# Patient Record
Sex: FEMALE | Race: BLACK OR AFRICAN AMERICAN | Employment: FULL TIME | URBAN - METROPOLITAN AREA
[De-identification: names, ages, dates, MRNs, and addresses within clinical notes are randomized per-mention and may not be internally consistent; named-entity substitution may affect disease eponyms.]

---

## 2017-11-21 ENCOUNTER — TELEPHONE (OUTPATIENT)
Dept: INTERNAL MEDICINE CLINIC | Facility: CLINIC | Age: 52
End: 2017-11-21

## 2017-11-22 NOTE — TELEPHONE ENCOUNTER
[facility] lab ordered last year 16   Just   Dx Z00.00  She is responsible to check on coverage of code z00.00 since she is requesting labs ahead of time I am not sure what code to use

## 2017-11-27 NOTE — TELEPHONE ENCOUNTER
Spoke to Pt states will just wait until the day of the appt for the lab orders . Pt verbalized understanding denied questions .

## 2017-12-05 ENCOUNTER — OFFICE VISIT (OUTPATIENT)
Dept: INTERNAL MEDICINE CLINIC | Facility: CLINIC | Age: 52
End: 2017-12-05

## 2017-12-05 VITALS
WEIGHT: 192 LBS | BODY MASS INDEX: 31.99 KG/M2 | DIASTOLIC BLOOD PRESSURE: 67 MMHG | TEMPERATURE: 97 F | HEIGHT: 65 IN | HEART RATE: 102 BPM | SYSTOLIC BLOOD PRESSURE: 92 MMHG

## 2017-12-05 DIAGNOSIS — Z12.11 SCREENING FOR COLON CANCER: ICD-10-CM

## 2017-12-05 DIAGNOSIS — Z00.00 ROUTINE GENERAL MEDICAL EXAMINATION AT A HEALTH CARE FACILITY: Primary | ICD-10-CM

## 2017-12-05 DIAGNOSIS — I83.893 VARICOSE VEINS OF BOTH LEGS WITH EDEMA: ICD-10-CM

## 2017-12-05 DIAGNOSIS — Z12.31 VISIT FOR SCREENING MAMMOGRAM: ICD-10-CM

## 2017-12-05 PROCEDURE — 99396 PREV VISIT EST AGE 40-64: CPT | Performed by: INTERNAL MEDICINE

## 2017-12-05 NOTE — PROGRESS NOTES
HPI:    Patient ID: Alix Riley is a 47year old female.     HPI    Physical exam    Generally healthy  Feeling well    BP 92/67 (BP Location: Right arm, Patient Position: Sitting, Cuff Size: adult)   Pulse 102   Temp (!) 97.1 °F (36.2 °C) (Oral)   Ht 5' Disorder Father      DVT   • Other[other] [OTHER] Maternal Grandmother       Social History: Smoking status: Never Smoker                                                              Smokeless tobacco: Never Used                      Alcohol use:  No SCREENING BILAT (CPT=77067)        Self breast exam aadvsied    (Z12.11) Screening for colon cancer  Plan: GASTRO - INTERNAL        asymptamtic    (C41.482) Varicose veins of both legs with edema  Plan: VASCULAR SURGERY - INTERNAL        [facility]

## 2019-03-13 ENCOUNTER — OFFICE VISIT (OUTPATIENT)
Dept: INTERNAL MEDICINE CLINIC | Facility: CLINIC | Age: 54
End: 2019-03-13
Payer: COMMERCIAL

## 2019-03-13 ENCOUNTER — APPOINTMENT (OUTPATIENT)
Dept: LAB | Age: 54
End: 2019-03-13
Attending: INTERNAL MEDICINE
Payer: COMMERCIAL

## 2019-03-13 VITALS
HEART RATE: 83 BPM | SYSTOLIC BLOOD PRESSURE: 108 MMHG | DIASTOLIC BLOOD PRESSURE: 74 MMHG | HEIGHT: 65 IN | TEMPERATURE: 99 F | WEIGHT: 183 LBS | BODY MASS INDEX: 30.49 KG/M2

## 2019-03-13 DIAGNOSIS — Z12.31 VISIT FOR SCREENING MAMMOGRAM: ICD-10-CM

## 2019-03-13 DIAGNOSIS — Z00.00 ROUTINE GENERAL MEDICAL EXAMINATION AT A HEALTH CARE FACILITY: Primary | ICD-10-CM

## 2019-03-13 DIAGNOSIS — Z12.11 COLON CANCER SCREENING: ICD-10-CM

## 2019-03-13 DIAGNOSIS — Z00.00 ROUTINE GENERAL MEDICAL EXAMINATION AT A HEALTH CARE FACILITY: ICD-10-CM

## 2019-03-13 LAB
ALBUMIN SERPL-MCNC: 3.8 G/DL (ref 3.4–5)
ALBUMIN/GLOB SERPL: 1 {RATIO} (ref 1–2)
ALP LIVER SERPL-CCNC: 70 U/L (ref 41–108)
ALT SERPL-CCNC: 29 U/L (ref 13–56)
ANION GAP SERPL CALC-SCNC: 8 MMOL/L (ref 0–18)
AST SERPL-CCNC: 17 U/L (ref 15–37)
BACTERIA UR QL AUTO: NEGATIVE /HPF
BILIRUB SERPL-MCNC: 0.5 MG/DL (ref 0.1–2)
BUN BLD-MCNC: 11 MG/DL (ref 7–18)
BUN/CREAT SERPL: 14.3 (ref 10–20)
CALCIUM BLD-MCNC: 9.1 MG/DL (ref 8.5–10.1)
CHLORIDE SERPL-SCNC: 106 MMOL/L (ref 98–107)
CHOLEST SMN-MCNC: 150 MG/DL (ref ?–200)
CO2 SERPL-SCNC: 27 MMOL/L (ref 21–32)
CREAT BLD-MCNC: 0.77 MG/DL (ref 0.55–1.02)
DEPRECATED RDW RBC AUTO: 40.4 FL (ref 35.1–46.3)
ERYTHROCYTE [DISTWIDTH] IN BLOOD BY AUTOMATED COUNT: 13.6 % (ref 11–15)
GLOBULIN PLAS-MCNC: 3.9 G/DL (ref 2.8–4.4)
GLUCOSE BLD-MCNC: 91 MG/DL (ref 70–99)
HCT VFR BLD AUTO: 38.5 % (ref 35–48)
HDLC SERPL-MCNC: 53 MG/DL (ref 40–59)
HGB BLD-MCNC: 12.4 G/DL (ref 12–16)
LDLC SERPL CALC-MCNC: 82 MG/DL (ref ?–100)
M PROTEIN MFR SERPL ELPH: 7.7 G/DL (ref 6.4–8.2)
MCH RBC QN AUTO: 26.6 PG (ref 26–34)
MCHC RBC AUTO-ENTMCNC: 32.2 G/DL (ref 31–37)
MCV RBC AUTO: 82.4 FL (ref 80–100)
NONHDLC SERPL-MCNC: 97 MG/DL (ref ?–130)
OSMOLALITY SERPL CALC.SUM OF ELEC: 291 MOSM/KG (ref 275–295)
PLATELET # BLD AUTO: 258 10(3)UL (ref 150–450)
POTASSIUM SERPL-SCNC: 3.8 MMOL/L (ref 3.5–5.1)
RBC # BLD AUTO: 4.67 X10(6)UL (ref 3.8–5.3)
RBC #/AREA URNS AUTO: 1 /HPF
SODIUM SERPL-SCNC: 141 MMOL/L (ref 136–145)
T4 FREE SERPL-MCNC: 0.9 NG/DL (ref 0.8–1.7)
TRIGL SERPL-MCNC: 75 MG/DL (ref 30–149)
TSI SER-ACNC: 1.66 MIU/ML (ref 0.36–3.74)
VLDLC SERPL CALC-MCNC: 15 MG/DL (ref 0–30)
WBC # BLD AUTO: 6.6 X10(3) UL (ref 4–11)
WBC #/AREA URNS AUTO: 3 /HPF

## 2019-03-13 PROCEDURE — 81015 MICROSCOPIC EXAM OF URINE: CPT

## 2019-03-13 PROCEDURE — 83036 HEMOGLOBIN GLYCOSYLATED A1C: CPT

## 2019-03-13 PROCEDURE — 36415 COLL VENOUS BLD VENIPUNCTURE: CPT

## 2019-03-13 PROCEDURE — 84439 ASSAY OF FREE THYROXINE: CPT

## 2019-03-13 PROCEDURE — 80061 LIPID PANEL: CPT

## 2019-03-13 PROCEDURE — 99396 PREV VISIT EST AGE 40-64: CPT | Performed by: INTERNAL MEDICINE

## 2019-03-13 PROCEDURE — 80053 COMPREHEN METABOLIC PANEL: CPT

## 2019-03-13 PROCEDURE — 85027 COMPLETE CBC AUTOMATED: CPT

## 2019-03-13 PROCEDURE — 84443 ASSAY THYROID STIM HORMONE: CPT

## 2019-03-14 ENCOUNTER — OFFICE VISIT (OUTPATIENT)
Dept: OBGYN CLINIC | Facility: CLINIC | Age: 54
End: 2019-03-14
Payer: COMMERCIAL

## 2019-03-14 VITALS — SYSTOLIC BLOOD PRESSURE: 124 MMHG | DIASTOLIC BLOOD PRESSURE: 72 MMHG | WEIGHT: 181 LBS | BODY MASS INDEX: 30 KG/M2

## 2019-03-14 DIAGNOSIS — Z30.432 ENCOUNTER FOR REMOVAL OF INTRAUTERINE CONTRACEPTIVE DEVICE: Primary | ICD-10-CM

## 2019-03-14 LAB
EST. AVERAGE GLUCOSE BLD GHB EST-MCNC: 189 MG/DL (ref 68–126)
HBA1C MFR BLD HPLC: 8.2 % (ref ?–5.7)

## 2019-03-14 PROCEDURE — 58301 REMOVE INTRAUTERINE DEVICE: CPT | Performed by: OBSTETRICS & GYNECOLOGY

## 2019-03-24 NOTE — PROGRESS NOTES
HPI:    Patient ID: Pam Alvarado is a 53year old female.     HPI    Physical exam    Generally healthy    /74 (BP Location: Left arm, Patient Position: Sitting, Cuff Size: adult)   Pulse 83   Temp 98.7 °F (37.1 °C) (Oral)   Ht 5' 5\" (1.651 m)   Wt embolism ([facility] [address] 2012      No past surgical history on file.    Family History   Problem Relation Age of Onset   • Clotting Disorder Father         DVT   • Other (Other[other]) Maternal Grandmother       Social History: Social History    Tobacco Use      Smok A1C, URINE MICROSCOPIC W REFLEX         CULTURE        Generally healthy    (Z12.31) Visit for screening mammogram  Plan: [facility] SCREENING BILAT (FTP=98433)        Self breast exam advsed   Routine pap advised  Body mass index is 30.45 kg/m².   Healthy diet  E

## 2019-05-21 ENCOUNTER — APPOINTMENT (OUTPATIENT)
Dept: LAB | Age: 54
End: 2019-05-21
Attending: INTERNAL MEDICINE
Payer: COMMERCIAL

## 2019-05-21 ENCOUNTER — HOSPITAL ENCOUNTER (OUTPATIENT)
Dept: MAMMOGRAPHY | Age: 54
Discharge: HOME | End: 2019-05-21
Attending: INTERNAL MEDICINE
Payer: COMMERCIAL

## 2019-05-21 DIAGNOSIS — R73.01 ABNORMAL FASTING GLUCOSE: ICD-10-CM

## 2019-05-21 DIAGNOSIS — Z12.31 VISIT FOR SCREENING MAMMOGRAM: ICD-10-CM

## 2019-05-21 PROCEDURE — 77063 BREAST TOMOSYNTHESIS BI: CPT | Performed by: INTERNAL MEDICINE

## 2019-05-21 PROCEDURE — 83036 HEMOGLOBIN GLYCOSYLATED A1C: CPT

## 2019-05-21 PROCEDURE — 77067 SCR MAMMO BI INCL CAD: CPT | Performed by: INTERNAL MEDICINE

## 2019-05-21 PROCEDURE — 36415 COLL VENOUS BLD VENIPUNCTURE: CPT

## 2019-05-21 PROCEDURE — 82947 ASSAY GLUCOSE BLOOD QUANT: CPT

## 2019-05-22 ENCOUNTER — OFFICE VISIT (OUTPATIENT)
Dept: INTERNAL MEDICINE CLINIC | Facility: CLINIC | Age: 54
End: 2019-05-22
Payer: COMMERCIAL

## 2019-05-22 VITALS
WEIGHT: 187 LBS | SYSTOLIC BLOOD PRESSURE: 100 MMHG | HEIGHT: 66 IN | TEMPERATURE: 98 F | HEART RATE: 92 BPM | OXYGEN SATURATION: 97 % | DIASTOLIC BLOOD PRESSURE: 64 MMHG | BODY MASS INDEX: 30.05 KG/M2

## 2019-05-22 DIAGNOSIS — M10.072 ACUTE IDIOPATHIC GOUT OF LEFT FOOT: Primary | ICD-10-CM

## 2019-05-22 PROCEDURE — 99213 OFFICE O/P EST LOW 20 MIN: CPT | Performed by: INTERNAL MEDICINE

## 2019-05-22 PROCEDURE — 99212 OFFICE O/P EST SF 10 MIN: CPT | Performed by: INTERNAL MEDICINE

## 2019-05-22 NOTE — PROGRESS NOTES
HPI:    Patient ID: Herbert Lipscomb is a 53year old female.   Chief Complaint: Foot Pain (pt started having pain and swelling this moring of left foot and is moving up into her leg)    58-year-old female who presents with acute onset left foot swelling and d Constitutional: She is oriented to person, place, and time. She appears well-developed. No distress. HENT:   Head: Normocephalic and atraumatic. Eyes: Conjunctivae are normal. Right eye exhibits no discharge. Left eye exhibits no discharge.    Neck: Nec Preventive measures discussed and further education provided to patient in after visit summary. Potential medication side effects discussed. All questions answered. Patient understands and agrees to follow directions and advice.  Patient asked to sign up f Cartilage is a smooth substance that protects the ends of your bones and provides cushioning. When you have arthritis, this cartilage breaks down and can no longer protect your bones. This can happen from an autoimmune disease.  Or it can happen from wear a Gout is an inflammation of a joint due to a build-up of gout crystals in the joint fluid. This occurs when there is an excess of uric acid (a normal waste product) in the body.  Uric acid builds up in the body when the kidneys are unable to filter enough of · If pain medicines have been prescribed, take them exactly as directed.    Preventing attacks  · Minimize or avoid alcohol use. Excess alcohol intake can cause a gout attack. · Limit these foods and beverages:  ? Organ meats, such as kidneys and liver  ? Gout is a disease that affects the joints. It is caused by excess uric acid in your blood that may lead to crystals forming in your joints. Left untreated, it can lead to painful foot and joint deformities and even kidney problems.  But, by treating gout ea · Take any medicines prescribed by your healthcare provider. · Lose weight if you need to. · Reduce high fructose corn syrup in meals and drinks. · Reduce or cut out alcohol, particularly beer, but also red wine and spirits.   · Control blood pressure, d · Vegetables such as asparagus, cauliflower, spinach, and mushrooms used to be thought to contribute to an increased risk for a gout attack, but recent studies show that high purine vegetables don't increase the risk for a gout attack.   Eat more of these f Gout is a painful form of arthritis caused by an excess of uric acid. This is a waste product made by the body. It builds up in the body and forms crystals that collect in the joints, causing a gout attack.  Alcohol and certain foods can trigger a gout ezio The following guidelines are recommended by the [address] [place] [address] for people with gout. Your diet should be:  · High in fiber, whole grains, fruits, and vegetables. · Low in protein (15% of calories should come from protein.  Choose lean sources · Alcohol (particularly beer, but also red wine and spirits)  · Certain meats (red meat, processed meat, turkey)  · Organ meats (kidney, liver, sweetbread)  · Shellfish (lobster, crab, shrimp, scallop, mussel)  · Certain fish (anchovy, sardine, herring, ma

## 2019-05-22 NOTE — PATIENT INSTRUCTIONS
Aleve 500mg TWICE daily with food x 5-7 days. If symptoms don't improve, contact the office to discuss possible steroids.  If pain becomes severe or you are unable to put weight on your leg, please go to ER for further evaluation and to rule out septic arth Following a healthy lifestyle by losing weight and exercising can help ease symptoms of osteoarthritis. Strengthening muscles around the affected joint may will reduce the strain on the joint. Hot and cold packs may help.  Over-the-counter and prescription · When sitting or lying down, raise the painful joint to a level higher than your heart. · Apply an ice pack (ice cubes in a plastic bag wrapped in a thin towel) over the injured area for 20 minutes every 1 to 2 hours the first day for pain relief.  Contin Date Last Reviewed: 3/1/2017  © 3943-8065 [employer] [address]. [address] [facility], [address] [place]. All rights reserved. This information is not intended as a substitute for professional medical care.  Always follow your healthcare professional' How can I prevent gout? With a little effort, you may be able to prevent gout attacks in the future. Here are some things you can do:  · Don't eat foods high in purines  ? Certain meats (red meat, processed meat, turkey)  ?  Organ meats (kidney, liver, swe · Certain fish, including anchovies, sardines, fish eggs, and herring  · Shellfish  · Certain meats, such as red meat, hot dogs, luncheon meats, and turkey  · Organ meats, such as liver, kidneys, and sweetbreads  · Legumes, such as dried beans and peas  · Gout is a painful form of arthritis caused by an excess of uric acid. This is a waste product made by the body. It builds up in the body and forms crystals that collect in the joints, causing a gout attack.  Alcohol and certain foods can trigger a gout ezio The following guidelines are recommended by the [address] [place] [address] for people with gout. Your diet should be:  · High in fiber, whole grains, fruits, and vegetables. · Low in protein (15% of calories should come from protein.  Choose lean sources · Alcohol (particularly beer, but also red wine and spirits)  · Certain meats (red meat, processed meat, turkey)  · Organ meats (kidney, liver, sweetbread)  · Shellfish (lobster, crab, shrimp, scallop, mussel)  · Certain fish (anchovy, sardine, herring, ma

## 2019-05-29 ENCOUNTER — OFFICE VISIT (OUTPATIENT)
Dept: INTERNAL MEDICINE CLINIC | Facility: CLINIC | Age: 54
End: 2019-05-29
Payer: COMMERCIAL

## 2019-05-29 VITALS
BODY MASS INDEX: 29.57 KG/M2 | SYSTOLIC BLOOD PRESSURE: 110 MMHG | HEART RATE: 91 BPM | TEMPERATURE: 99 F | DIASTOLIC BLOOD PRESSURE: 70 MMHG | WEIGHT: 184 LBS | HEIGHT: 66 IN

## 2019-05-29 DIAGNOSIS — E66.3 OVERWEIGHT (BMI 25.0-29.9): ICD-10-CM

## 2019-05-29 DIAGNOSIS — M79.671 PAIN IN BOTH FEET: ICD-10-CM

## 2019-05-29 DIAGNOSIS — E11.9 TYPE 2 DIABETES MELLITUS WITHOUT COMPLICATION, WITHOUT LONG-TERM CURRENT USE OF INSULIN (HCC): Primary | ICD-10-CM

## 2019-05-29 DIAGNOSIS — M79.672 PAIN IN BOTH FEET: ICD-10-CM

## 2019-05-29 PROCEDURE — 99214 OFFICE O/P EST MOD 30 MIN: CPT | Performed by: INTERNAL MEDICINE

## 2019-05-29 PROCEDURE — 99212 OFFICE O/P EST SF 10 MIN: CPT | Performed by: INTERNAL MEDICINE

## 2019-05-29 RX ORDER — METFORMIN HYDROCHLORIDE 500 MG/1
500 TABLET, EXTENDED RELEASE ORAL 2 TIMES DAILY WITH MEALS
Qty: 60 TABLET | Refills: 11 | Status: SHIPPED | OUTPATIENT
Start: 2019-05-29 | End: 2019-05-29

## 2019-05-29 RX ORDER — METFORMIN HYDROCHLORIDE 500 MG/1
500 TABLET, EXTENDED RELEASE ORAL
Qty: 30 TABLET | Refills: 2 | Status: SHIPPED | OUTPATIENT
Start: 2019-05-29 | End: 2019-10-17

## 2019-05-29 NOTE — PATIENT INSTRUCTIONS
Component      Latest Ref Rng & Units 5/21/2019 3/13/2019   Glucose      70 - 99 mg/dL 153 (H) 91   Sodium      136 - 145 mmol/L  141   Potassium      3.5 - 5.1 mmol/L  3.8   Chloride      98 - 107 mmol/L  106   Carbon Dioxide, Total      21.0 - 32.0 mmol/ Eating well-balanced meals in the correct amounts will help you control your blood glucose levels and prevent low blood sugar reactions. It will also help you reduce the health risks of diabetes.  There is no one specific diet that is right for everyone wit diabetes already have a risk of high blood pressure and heart disease. · Stay at a healthy weight. If you need to lose weight, cut down on your portion sizes. But don’t skip meals.  Exercise is an important part of any weight management program. Talk with raise blood sugar. In fact, fiber can help keep blood sugar from rising too fast. It also helps keep blood cholesterol at a healthy level. Did you know?   Even though carbohydrates raise blood sugar, it’s best to have some in every meal. They are an import nuts, and soy products, such as tofu and soymilk. These sources tend to be cholesterol-free and low in saturated fat. · Animal protein is found in fish, poultry, meat, cheese, milk, and eggs. These contain cholesterol and can be high in saturated fat.  Aim herbs  · Broiled, roasted, or grilled chicken sandwich  · Sliced turkey or lean roast beef sandwich  Maldives  · Chicken enchilada, without cheese or sour cream   · Small burrito with whole beans and chicken  · Whole beans (not refried) and rice  · Chicken you all about managing diabetes. · A health psychologist or , who can help you cope with the feelings and stresses that diabetes may bring.   · Other healthcare team members can include an eye healthcare provider, dentist, podiatrist, Nena Garcia yams, and squash. Kidney beans, solorzano beans, black beans, garbanzo beans, and lentils also contain starches. Sugars  Sugars are found naturally in many foods. Or sugar can be added.  Foods that contain natural sugar include fruits and fruit juices, dairy p of bread  · 1/2 cup of oatmeal  · 1/3 cup of rice  · 4 to 6 crackers  · 1/2 English muffin  · 1/2 cup of black beans  · 1/4 of a large baked potato (3 ounces)  · 2/3 cup of plain fat-free yogurt  · 1 cup of soup  · 1/2 cup of casserole  · 6 chicken nuggets 7/1/2016  © 5033-4260 [employer] [address]. [address] [facility], [address] [place] [address]. All rights reserved. This information is not intended as a substitute for professional medical care. Always follow your healthcare professional's instructions. your thumb. Keeping track of serving sizes  When you’re planning for a snack or a meal, keep servings in mind.  If you don’t have measuring cups or a scale handy, there are ways to [place] serving sizes, such as comparing your food to the size of your ha

## 2019-05-29 NOTE — PROGRESS NOTES
HPI:    Patient ID: Nicole Rodriguez is a 53year old female.     HPI     New onset diabetes  Discuss labs    /70 (BP Location: Right arm, Patient Position: Sitting, Cuff Size: large)   Pulse 91   Temp 98.6 °F (37 °C) (Oral)   Ht 5' 6\" (1.676 m)   Wt 18 • Personal history of pulmonary embolism 6/4/2013   • Pulmonary embolism ([facility] [address] 2012      No past surgical history on file.    Family History   Problem Relation Age of Onset   • Clotting Disorder Father         DVT   • Other (Other) Maternal Grandmother NON HDL CHOL      <130 mg/dL  97   SQUAM EPI CELLS UR      /HPF  Few   WBC Urine      0 - 5 /HPF  3   RBC URINE      0 - 3 /HPF  1   Bacteria Urine      None Seen /HPF  Negative   HEMOGLOBIN A1c      <5.7 % 8.2 (H) 8.2 (H)   ESTIMATED AVERAGE GLUCOSE understanding and agrees with plan  Pt given time to ask questions  After Visit Summary handout    Discussed  And given to patient.         Orders Placed This Encounter      ALT(SGPT) [E]      AST (SGOT) [E]      Basic Metabolic Panel (8) [E]      Hemoglobi

## 2019-05-29 NOTE — H&P
[facility], [facility] - Gastroenterology                                                                                                  Clinic History and Physical acute distress  HEENT: EOMI, no scleral icterus, MMM; oral pharnyx is without exudates or lesions  Neck: no lymphadenopathy; thyroid is not enlarged and without nodules  CV: RRR  Resp: non-labored breathing  Abd: soft, non-tender, non-distended  Ext: no lo

## 2019-05-30 ENCOUNTER — OFFICE VISIT (OUTPATIENT)
Dept: GASTROENTEROLOGY | Facility: CLINIC | Age: 54
End: 2019-05-30
Payer: COMMERCIAL

## 2019-05-30 ENCOUNTER — TELEPHONE (OUTPATIENT)
Dept: GASTROENTEROLOGY | Facility: CLINIC | Age: 54
End: 2019-05-30

## 2019-05-30 VITALS
HEART RATE: 81 BPM | HEIGHT: 66 IN | SYSTOLIC BLOOD PRESSURE: 113 MMHG | BODY MASS INDEX: 30.53 KG/M2 | WEIGHT: 190 LBS | DIASTOLIC BLOOD PRESSURE: 73 MMHG

## 2019-05-30 DIAGNOSIS — Z79.899 MEDICATION MANAGEMENT: ICD-10-CM

## 2019-05-30 DIAGNOSIS — Z12.12 SCREENING FOR COLORECTAL CANCER: Primary | ICD-10-CM

## 2019-05-30 DIAGNOSIS — Z12.11 SCREEN FOR COLON CANCER: Primary | ICD-10-CM

## 2019-05-30 DIAGNOSIS — Z12.11 SCREENING FOR COLORECTAL CANCER: Primary | ICD-10-CM

## 2019-05-30 PROCEDURE — 99243 OFF/OP CNSLTJ NEW/EST LOW 30: CPT | Performed by: INTERNAL MEDICINE

## 2019-05-30 RX ORDER — POLYETHYLENE GLYCOL 3350, SODIUM CHLORIDE, SODIUM BICARBONATE, POTASSIUM CHLORIDE 420; 11.2; 5.72; 1.48 G/4L; G/4L; G/4L; G/4L
POWDER, FOR SOLUTION ORAL
Qty: 1 BOTTLE | Refills: 0 | Status: ON HOLD | OUTPATIENT
Start: 2019-05-30 | End: 2019-07-01

## 2019-05-30 NOTE — TELEPHONE ENCOUNTER
Scheduled for:  Colonoscopy 55557  Provider Name: Dr. Lisa Arrington  Date:  7/1/19  Location:  [facility]  Sedation:  IV  Time:  10:45 am, arrival 9:45 am  Prep: Trilyte  Meds/Allergies Reconciled?:  Physician reviewed  Diagnosis with codes:  Screening Z12.11  Was patient i

## 2019-05-30 NOTE — PATIENT INSTRUCTIONS
1. Schedule colonoscopy with IV/conscious sedation at the hospital or MAC at the [facility] [Diagnosis: colorectal cancer screening]    2.  bowel prep from pharmacy ([facility])    3. Medication adjustment:       A.  Day BEFORE colonoscopy: HOLD metform

## 2019-06-26 NOTE — TELEPHONE ENCOUNTER
I called and spoke to pt informing her that her procedure time has been changed to 8:15 am @ same location and same date.  I informed pt that her arrival time is 7:15 am.   Pt then stated that she was not able to find  for that day but does not wish t

## 2019-06-28 NOTE — TELEPHONE ENCOUNTER
Called Irena to Sona regarding changed of time for this pt to be moved down to 0830 Am on July 1 at [facility] for Colonoscopy . As per Irena stated ,its okay to moved the time to 0830 for this Pt . Will send a new Revision scheduling request to Sona .     Scheduled for

## 2019-07-01 ENCOUNTER — HOSPITAL ENCOUNTER (OUTPATIENT)
Facility: HOSPITAL | Age: 54
Setting detail: HOSPITAL OUTPATIENT SURGERY
Discharge: HOME | End: 2019-07-01
Attending: INTERNAL MEDICINE | Admitting: INTERNAL MEDICINE
Payer: COMMERCIAL

## 2019-07-01 VITALS
HEART RATE: 86 BPM | OXYGEN SATURATION: 98 % | RESPIRATION RATE: 16 BRPM | SYSTOLIC BLOOD PRESSURE: 105 MMHG | BODY MASS INDEX: 28.25 KG/M2 | DIASTOLIC BLOOD PRESSURE: 79 MMHG | HEIGHT: 67 IN | WEIGHT: 180 LBS

## 2019-07-01 DIAGNOSIS — Z12.11 SCREEN FOR COLON CANCER: ICD-10-CM

## 2019-07-01 LAB — GLUCOSE BLDC GLUCOMTR-MCNC: 132 MG/DL (ref 70–99)

## 2019-07-01 PROCEDURE — G0500 MOD SEDAT ENDO SERVICE >5YRS: HCPCS | Performed by: INTERNAL MEDICINE

## 2019-07-01 PROCEDURE — 0DBL8ZX EXCISION OF TRANSVERSE COLON, VIA NATURAL OR ARTIFICIAL OPENING ENDOSCOPIC, DIAGNOSTIC: ICD-10-PCS | Performed by: INTERNAL MEDICINE

## 2019-07-01 PROCEDURE — 45380 COLONOSCOPY AND BIOPSY: CPT | Performed by: INTERNAL MEDICINE

## 2019-07-01 PROCEDURE — 0DBK8ZX EXCISION OF ASCENDING COLON, VIA NATURAL OR ARTIFICIAL OPENING ENDOSCOPIC, DIAGNOSTIC: ICD-10-PCS | Performed by: INTERNAL MEDICINE

## 2019-07-01 PROCEDURE — 0DBH8ZX EXCISION OF CECUM, VIA NATURAL OR ARTIFICIAL OPENING ENDOSCOPIC, DIAGNOSTIC: ICD-10-PCS | Performed by: INTERNAL MEDICINE

## 2019-07-01 PROCEDURE — 45385 COLONOSCOPY W/LESION REMOVAL: CPT | Performed by: INTERNAL MEDICINE

## 2019-07-01 RX ORDER — MIDAZOLAM HYDROCHLORIDE 1 MG/ML
INJECTION INTRAMUSCULAR; INTRAVENOUS
Status: DISCONTINUED | OUTPATIENT
Start: 2019-07-01 | End: 2019-07-01

## 2019-07-01 RX ORDER — MIDAZOLAM HYDROCHLORIDE 1 MG/ML
1 INJECTION INTRAMUSCULAR; INTRAVENOUS EVERY 5 MIN PRN
Status: DISCONTINUED | OUTPATIENT
Start: 2019-07-01 | End: 2019-07-01

## 2019-07-01 RX ORDER — SODIUM CHLORIDE, SODIUM LACTATE, POTASSIUM CHLORIDE, CALCIUM CHLORIDE 600; 310; 30; 20 MG/100ML; MG/100ML; MG/100ML; MG/100ML
INJECTION, SOLUTION INTRAVENOUS CONTINUOUS
Status: DISCONTINUED | OUTPATIENT
Start: 2019-07-01 | End: 2019-07-01

## 2019-07-01 RX ORDER — SODIUM CHLORIDE 0.9 % (FLUSH) 0.9 %
10 SYRINGE (ML) INJECTION AS NEEDED
Status: DISCONTINUED | OUTPATIENT
Start: 2019-07-01 | End: 2019-07-01

## 2019-07-01 NOTE — H&P
History & Physical Examination    Patient Name: Jose Izquierdo  MRN: [id number]  CSN: [id number]  YOB: 1965    Diagnosis: colorectal cancer screening      Medications Prior to Admission:  FENUGREEK OR Take by mouth.  Disp:  Rfl:  6/25/2019 at 0

## 2019-07-01 NOTE — OPERATIVE REPORT
Colonoscopy Report    Mervat Pandey     1965 Age 53year old   PCP Nikhil Robbins MD Endoscopist Jamison Ibarra MD     Date of procedure: 19    Procedure: Colonoscopy w/ biopsy and snare polypectomy    Pre-operative diagnosis: colorectal removed. The patient tolerated the procedure well. There were no immediate postoperative complications. The patient’s vital signs were monitored throughout the procedure and remained stable.     Estimated blood loss: insignificant    Specimens collected:  C

## 2019-07-02 ENCOUNTER — TELEPHONE (OUTPATIENT)
Dept: GASTROENTEROLOGY | Facility: CLINIC | Age: 54
End: 2019-07-02

## 2019-08-05 ENCOUNTER — OFFICE VISIT (OUTPATIENT)
Dept: INTERNAL MEDICINE CLINIC | Facility: CLINIC | Age: 54
End: 2019-08-05
Payer: COMMERCIAL

## 2019-08-05 ENCOUNTER — TELEPHONE (OUTPATIENT)
Dept: INTERNAL MEDICINE CLINIC | Facility: CLINIC | Age: 54
End: 2019-08-05

## 2019-08-05 VITALS
DIASTOLIC BLOOD PRESSURE: 63 MMHG | WEIGHT: 181 LBS | SYSTOLIC BLOOD PRESSURE: 99 MMHG | TEMPERATURE: 99 F | HEART RATE: 73 BPM | BODY MASS INDEX: 29.09 KG/M2 | HEIGHT: 66 IN

## 2019-08-05 DIAGNOSIS — E11.9 TYPE 2 DIABETES MELLITUS WITHOUT COMPLICATION, WITHOUT LONG-TERM CURRENT USE OF INSULIN (HCC): ICD-10-CM

## 2019-08-05 DIAGNOSIS — I26.92 ACUTE SADDLE PULMONARY EMBOLISM WITHOUT ACUTE COR PULMONALE (HCC): Primary | ICD-10-CM

## 2019-08-05 DIAGNOSIS — E66.3 OVERWEIGHT (BMI 25.0-29.9): ICD-10-CM

## 2019-08-05 PROCEDURE — 99214 OFFICE O/P EST MOD 30 MIN: CPT | Performed by: INTERNAL MEDICINE

## 2019-08-05 PROCEDURE — 1111F DSCHRG MED/CURRENT MED MERGE: CPT | Performed by: INTERNAL MEDICINE

## 2019-08-05 NOTE — TELEPHONE ENCOUNTER
Pt dropping off disability forms that she needs to be completed. Please call pt when forms have been completed. Pt will pay $25  when she picks up completed forms.

## 2019-08-06 NOTE — TELEPHONE ENCOUNTER
[employer]  forms for  received in Forms dept+ FCR+ Signed releasek. Logged for processing.  NK

## 2019-08-12 NOTE — PROGRESS NOTES
HPI:    Patient ID: Manuel Lawton is a 53year old female.     HPI  Admitted at [address] [place] [address] with dx of  Saddle emoblus pulmonary  Treated medically  Prior uprovoked PE 2012     She feels  Better    BP 99/63 (BP Location: Left arm, Patient Position: Sitting, Cuff  MG Oral Tablet 24 Hr Take 1 tablet (500 mg total) by mouth daily with breakfast. Disp: 30 tablet Rfl: 2     Allergies:No Known Allergies    HISTORY:  Past Medical History:   Diagnosis Date   • Colon adenomas 07/01/2019   • Diabetes ([facility] 2019   • Hi She is alert. Skin: No rash noted. She is not diaphoretic. No erythema. Nursing note and vitals reviewed.            ASSESSMENT/PLAN:   (I26.92) Acute saddle pulmonary embolism without acute cor pulmonale (HCC)  (primary encounter diagnosis)  Plan: cont

## 2019-08-12 NOTE — TELEPHONE ENCOUNTER
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 Will she need restrictions when returning to work? Please sign off on form:  -Highlight the patient and hit \"Chart\" button.   -In Chart Review, w/in the Encounter

## 2019-08-13 NOTE — TELEPHONE ENCOUNTER
Faxed FMLA/Disab form to [employer] - [phone]. Mailed copy to pt as requested. Notified pt. Billed.

## 2019-10-17 DIAGNOSIS — E11.9 TYPE 2 DIABETES MELLITUS WITHOUT COMPLICATION, WITHOUT LONG-TERM CURRENT USE OF INSULIN (HCC): ICD-10-CM

## 2019-10-18 RX ORDER — METFORMIN HYDROCHLORIDE 500 MG/1
TABLET, EXTENDED RELEASE ORAL
Qty: 30 TABLET | Refills: 2 | Status: SHIPPED | OUTPATIENT
Start: 2019-10-18 | End: 2020-01-13

## 2019-10-18 NOTE — TELEPHONE ENCOUNTER
Please review; protocol failed.     Recent Visits  Date Type Provider Dept   08/05/19 Office Visit MD Lorne Gorman-Internal Med   05/29/19 Office Visit MD [facility] Gorman-Internal Med   05/22/19 Office Visit Trudy Vallejo MD [facility]

## 2020-01-13 DIAGNOSIS — E11.9 TYPE 2 DIABETES MELLITUS WITHOUT COMPLICATION, WITHOUT LONG-TERM CURRENT USE OF INSULIN (HCC): ICD-10-CM

## 2020-01-13 RX ORDER — METFORMIN HYDROCHLORIDE 500 MG/1
TABLET, EXTENDED RELEASE ORAL
Qty: 90 TABLET | Refills: 0 | Status: SHIPPED | OUTPATIENT
Start: 2020-01-13 | End: 2020-04-15

## 2020-01-14 NOTE — TELEPHONE ENCOUNTER
Please review; protocol failed. HgA1c was out of range please advise.   Diabetes Medications  Protocol Criteria:  · Appointment scheduled in the past 6 months or the next 3 months  · A1C < 7.5 in the past 6 months  · Creatinine in the past 12 months  · Cre

## 2020-01-17 ENCOUNTER — OFFICE VISIT (OUTPATIENT)
Dept: INTERNAL MEDICINE CLINIC | Facility: CLINIC | Age: 55
End: 2020-01-17
Payer: COMMERCIAL

## 2020-01-17 ENCOUNTER — APPOINTMENT (OUTPATIENT)
Dept: LAB | Age: 55
End: 2020-01-17
Attending: INTERNAL MEDICINE
Payer: COMMERCIAL

## 2020-01-17 ENCOUNTER — LAB ENCOUNTER (OUTPATIENT)
Dept: LAB | Age: 55
End: 2020-01-17
Attending: INTERNAL MEDICINE
Payer: COMMERCIAL

## 2020-01-17 ENCOUNTER — HOSPITAL ENCOUNTER (OUTPATIENT)
Dept: GENERAL RADIOLOGY | Age: 55
Discharge: HOME | End: 2020-01-17
Attending: INTERNAL MEDICINE
Payer: COMMERCIAL

## 2020-01-17 VITALS
BODY MASS INDEX: 29.25 KG/M2 | TEMPERATURE: 98 F | DIASTOLIC BLOOD PRESSURE: 69 MMHG | SYSTOLIC BLOOD PRESSURE: 103 MMHG | HEIGHT: 66 IN | HEART RATE: 69 BPM | WEIGHT: 182 LBS

## 2020-01-17 DIAGNOSIS — M54.50 ACUTE RIGHT-SIDED LOW BACK PAIN WITHOUT SCIATICA: ICD-10-CM

## 2020-01-17 DIAGNOSIS — E11.9 TYPE 2 DIABETES MELLITUS WITHOUT COMPLICATION, WITHOUT LONG-TERM CURRENT USE OF INSULIN (HCC): ICD-10-CM

## 2020-01-17 DIAGNOSIS — R82.90 ABNORMAL FINDING ON URINALYSIS: Primary | ICD-10-CM

## 2020-01-17 LAB
ALT SERPL-CCNC: 22 U/L (ref 13–56)
ANION GAP SERPL CALC-SCNC: 4 MMOL/L (ref 0–18)
AST SERPL-CCNC: 11 U/L (ref 15–37)
BACTERIA UR QL AUTO: NEGATIVE /HPF
BILIRUB UR QL: NEGATIVE
BUN BLD-MCNC: 12 MG/DL (ref 7–18)
BUN/CREAT SERPL: 14.3 (ref 10–20)
CALCIUM BLD-MCNC: 9.4 MG/DL (ref 8.5–10.1)
CHLORIDE SERPL-SCNC: 109 MMOL/L (ref 98–112)
CO2 SERPL-SCNC: 29 MMOL/L (ref 21–32)
COLOR UR: YELLOW
CREAT BLD-MCNC: 0.84 MG/DL (ref 0.55–1.02)
EST. AVERAGE GLUCOSE BLD GHB EST-MCNC: 154 MG/DL (ref 68–126)
GLUCOSE BLD-MCNC: 102 MG/DL (ref 70–99)
GLUCOSE UR-MCNC: NEGATIVE MG/DL
HBA1C MFR BLD HPLC: 7 % (ref ?–5.7)
HYALINE CASTS #/AREA URNS AUTO: 1 /LPF
KETONES UR-MCNC: NEGATIVE MG/DL
NITRITE UR QL STRIP.AUTO: NEGATIVE
OSMOLALITY SERPL CALC.SUM OF ELEC: 294 MOSM/KG (ref 275–295)
PATIENT FASTING Y/N/NP: YES
PH UR: 5 [PH] (ref 5–8)
POTASSIUM SERPL-SCNC: 4.2 MMOL/L (ref 3.5–5.1)
PROT UR-MCNC: 14.2 MG/DL
PROT UR-MCNC: NEGATIVE MG/DL
RBC #/AREA URNS AUTO: 3 /HPF
SODIUM SERPL-SCNC: 142 MMOL/L (ref 136–145)
SP GR UR STRIP: 1.02 (ref 1–1.03)
UROBILINOGEN UR STRIP-ACNC: <2
WBC #/AREA URNS AUTO: 42 /HPF

## 2020-01-17 PROCEDURE — 72110 X-RAY EXAM L-2 SPINE 4/>VWS: CPT | Performed by: INTERNAL MEDICINE

## 2020-01-17 PROCEDURE — 80048 BASIC METABOLIC PNL TOTAL CA: CPT

## 2020-01-17 PROCEDURE — 84156 ASSAY OF PROTEIN URINE: CPT

## 2020-01-17 PROCEDURE — 99213 OFFICE O/P EST LOW 20 MIN: CPT | Performed by: INTERNAL MEDICINE

## 2020-01-17 PROCEDURE — 84460 ALANINE AMINO (ALT) (SGPT): CPT

## 2020-01-17 PROCEDURE — 84450 TRANSFERASE (AST) (SGOT): CPT

## 2020-01-17 PROCEDURE — 83036 HEMOGLOBIN GLYCOSYLATED A1C: CPT

## 2020-01-17 PROCEDURE — 36415 COLL VENOUS BLD VENIPUNCTURE: CPT

## 2020-01-17 PROCEDURE — 87086 URINE CULTURE/COLONY COUNT: CPT

## 2020-01-17 PROCEDURE — 81001 URINALYSIS AUTO W/SCOPE: CPT

## 2020-01-17 NOTE — PATIENT INSTRUCTIONS
Component      Latest Ref Rng & Units 1/17/2020   Glucose      70 - 99 mg/dL 102 (H)   Sodium      136 - 145 mmol/L 142   Potassium      3.5 - 5.1 mmol/L 4.2   Chloride      98 - 112 mmol/L 109   Carbon Dioxide, Total      21.0 - 32.0 mmol/L 29.0   ANION G minutes a day, 1 to 3 times a day. Initial exercises  Lying on your back:  1. Ankle pumps: Move your foot up and down, towards your head, and then away. Repeat 10 times with each foot.   2. Heel slides: Slowly bend your knee, drawing the heel of your foot alternating 10 times. Gradually build up to 20 times. (Advanced: Repeat this exercise raising both arms and both legs a few inches off the floor at the same time. Hold for 5 seconds and release.)  3.  Pelvic tilt: Lie on the floor on your back with your kne bread, cereals, pasta, and dried beans. They’re also found in corn, peas, potatoes, yam, acorn squash, and butternut squash. Starches also raise blood sugar.   · Fiber is found in foods such as vegetables, fruits, beans, and whole grains.  Unlike other carb the body build and repair muscle and other tissue. Protein has little or no effect on blood sugar. However, many foods that contain protein also contain saturated fat.  By choosing low-fat protein sources, you can get the benefits of protein without the ext can have at each meal.  ? Grains, beans, and starchy vegetables  ? Vegetables  ? Fruit  ? Milk or yogurt  ? Meat, poultry, fish, or tofu  ? Healthy fats  · Check your blood sugar levels as directed by your provider.  Take any medicine as prescribed by your kind of food to function. To keep your energy level up, your body needs food that has carbohydrates. But carbs raise blood sugar levels higher and faster than other kinds of food.  Your dietitian will work with you to figure out the amount of carbs you need eat at each meal.  Carbs come from a variety of foods. These include grains, starchy vegetables, fruit, milk, beans, and snack foods. You can either count carbohydrate grams or carbohydrate servings.  When you count carbohydrate servings, 1 carbohydrate ser find the products that work best for you.   · Don't forget protein and fat. With the focus on carb counting, it might be easy to forget protein and fat in your meals. Don't forget to include sources of protein and healthy fat to balance your meals.  Also wa

## 2020-01-17 NOTE — PROGRESS NOTES
HPI:    Patient ID: Atwood Link is a 53year old female. Low Back Pain   This is a new problem. The current episode started in the past 7 days. The problem occurs constantly. The problem has been gradually worsening since onset.  The pain is present in • FENUGREEK OR Take by mouth.        Allergies:No Known Allergies    HISTORY:  Past Medical History:   Diagnosis Date   • Colon adenomas 2019   • Diabetes ([facility] [address]    • History of pregnancy 1991       • [facility]       Past S

## 2020-01-27 ENCOUNTER — TELEPHONE (OUTPATIENT)
Dept: INTERNAL MEDICINE CLINIC | Facility: CLINIC | Age: 55
End: 2020-01-27

## 2020-01-27 NOTE — TELEPHONE ENCOUNTER
Dr. Jewel Nayak please generate letter with MD instructions or recommendations needed for this patient .

## 2020-01-27 NOTE — TELEPHONE ENCOUNTER
Patient was seen on 01/17 for back issues. She is wanting to have a letter for work stating she needs raised computer stand. Please fax letter to: [phone]. Please call patient once complete.

## 2020-02-18 NOTE — TELEPHONE ENCOUNTER
Patient employer is requesting clarification in letter  gave patient to give them.        Please advise       tereso KILLIAN    # [phone]

## 2020-02-18 NOTE — TELEPHONE ENCOUNTER
To Whom It May Concern,     Joes Izquierdo (2/25/1965) is under my medical care. She is has chronic low back pain  And will benefit from an ergonomic desk and chair at work  Please provide her with the necessary  Office equipment.       WHAT CLARIFICATION D

## 2020-02-20 NOTE — TELEPHONE ENCOUNTER
Left message to call back. Transfer to triage; The patient is not returning our call or answering the TouchMailt message. No response letter sent in the mail to call us back.

## 2020-02-21 NOTE — TELEPHONE ENCOUNTER
Pt returned calls and states letter needs to say something to this extent: \"best to have a computer raised stand which will allow her to stand after sitting for long periods of time to circulate blood flow and stretch the back. \"      Please respond to po

## 2020-02-24 NOTE — TELEPHONE ENCOUNTER
New letter generated as approved by [facility], and faxed to [phone]. Waiting for confirmation.

## 2020-04-15 DIAGNOSIS — E11.9 TYPE 2 DIABETES MELLITUS WITHOUT COMPLICATION, WITHOUT LONG-TERM CURRENT USE OF INSULIN (HCC): ICD-10-CM

## 2020-04-15 RX ORDER — METFORMIN HYDROCHLORIDE 500 MG/1
TABLET, EXTENDED RELEASE ORAL
Qty: 90 TABLET | Refills: 0 | Status: SHIPPED | OUTPATIENT
Start: 2020-04-15 | End: 2020-06-16

## 2020-04-21 ENCOUNTER — TELEPHONE (OUTPATIENT)
Dept: INTERNAL MEDICINE CLINIC | Facility: CLINIC | Age: 55
End: 2020-04-21

## 2020-04-21 DIAGNOSIS — Z12.31 BREAST CANCER SCREENING BY MAMMOGRAM: Primary | ICD-10-CM

## 2020-04-21 NOTE — TELEPHONE ENCOUNTER
Dr. Christiane Vernon, patient is requesting a mammogram order. Last mammogram was completed 03/13/2019. Please advise on order.

## 2020-04-22 NOTE — TELEPHONE ENCOUNTER
Spoke to Pt informed order for mammogram has been placed . Number to scheduling provided . Pt verbalized understanding denied questions .

## 2020-05-13 ENCOUNTER — HOSPITAL ENCOUNTER (OUTPATIENT)
Dept: MAMMOGRAPHY | Age: 55
Discharge: HOME | End: 2020-05-13
Attending: INTERNAL MEDICINE
Payer: COMMERCIAL

## 2020-05-13 DIAGNOSIS — Z12.31 BREAST CANCER SCREENING BY MAMMOGRAM: ICD-10-CM

## 2020-05-13 PROCEDURE — 77067 SCR MAMMO BI INCL CAD: CPT | Performed by: INTERNAL MEDICINE

## 2020-05-13 PROCEDURE — 77063 BREAST TOMOSYNTHESIS BI: CPT | Performed by: INTERNAL MEDICINE

## 2020-06-10 ENCOUNTER — PATIENT MESSAGE (OUTPATIENT)
Dept: INTERNAL MEDICINE CLINIC | Facility: CLINIC | Age: 55
End: 2020-06-10

## 2020-06-10 NOTE — TELEPHONE ENCOUNTER
From: Ninfa Crater  To: Merilynn Apley, MD  Sent: 6/10/2020 2:53 PM CDT  Subject: Non-Urgent Medical Question    I lost my job due to the Covid-19, i know have Medicaid insurance can you please let me know if i can stay with this same Doctor for future vi

## 2020-06-16 ENCOUNTER — PATIENT MESSAGE (OUTPATIENT)
Dept: INTERNAL MEDICINE CLINIC | Facility: CLINIC | Age: 55
End: 2020-06-16

## 2020-06-16 DIAGNOSIS — E11.9 TYPE 2 DIABETES MELLITUS WITHOUT COMPLICATION, WITHOUT LONG-TERM CURRENT USE OF INSULIN (HCC): ICD-10-CM

## 2020-06-16 NOTE — TELEPHONE ENCOUNTER
From: Ross Polanco  To: Marisa Wynn MD  Sent: 6/16/2020 2:23 PM CDT  Subject: Prescription Question    As of May 25, 2020 insurance has changed. I now [facility] [address] [facility]   UOOTQQ#[id number], BIN# [id number]; PCN# [id number], D

## 2020-06-16 NOTE — TELEPHONE ENCOUNTER
Please see refill request telephone encounter--sent to PCP for approval    Routed to [facility] to update insurance information--please see patient's message below

## 2020-06-16 NOTE — TELEPHONE ENCOUNTER
Please see patient's MyChart message RE: refill request and advise    Medications pended for patient's preferred pharmacy    Metformin passes protocol, but no protocol for Eliquis    Prescription Question     Chela Bright MD 1 hour ago 10 months ago Acute saddle pulmonary embolism without acute cor pulmonale [facility])    Ayana Saez MD    Office Visit    1 year ago Screening for colorectal cancer    [address], [address], [place],

## 2020-06-17 RX ORDER — METFORMIN HYDROCHLORIDE 500 MG/1
500 TABLET, EXTENDED RELEASE ORAL
Qty: 30 TABLET | Refills: 0 | Status: SHIPPED | OUTPATIENT
Start: 2020-06-17 | End: 2020-07-13

## 2020-06-19 NOTE — TELEPHONE ENCOUNTER
Prior authorization for apixaban (ELIQUIS) 5 MG completed w/ [employer] on cover my meds Key: [id number], turn around time 3-5 days.

## 2020-06-22 ENCOUNTER — VIRTUAL PHONE E/M (OUTPATIENT)
Dept: INTERNAL MEDICINE CLINIC | Facility: CLINIC | Age: 55
End: 2020-06-22
Payer: MEDICAID

## 2020-06-22 DIAGNOSIS — Z86.711 HISTORY OF PULMONARY EMBOLUS (PE): ICD-10-CM

## 2020-06-22 DIAGNOSIS — E11.9 TYPE 2 DIABETES MELLITUS WITHOUT COMPLICATION, WITHOUT LONG-TERM CURRENT USE OF INSULIN (HCC): Primary | ICD-10-CM

## 2020-06-22 PROCEDURE — 99214 OFFICE O/P EST MOD 30 MIN: CPT | Performed by: INTERNAL MEDICINE

## 2020-06-22 NOTE — PROGRESS NOTES
Virtual Telephone Check-In    Simran Urrutia verbally consents to a Virtual/Telephone Check-In visit on 06/22/20. Patient has been referred to the [facility] website at [email or web address] to review the yearly Consent to Treat document.     Patient Chel Huerta throat. Eyes: Negative for pain, discharge and redness. Respiratory: Negative for cough, chest tightness, shortness of breath and wheezing. Cardiovascular: Negative for chest pain, palpitations and leg swelling.    Gastrointestinal: Negative for abd speaks in full sentences without shortness of breath             ASSESSMENT/PLAN:   (E11.9) Type 2 diabetes mellitus without complication, without long-term current use of insulin (HCC)  (primary encounter diagnosis)  Plan: ALT (SGPT), AST (SGOT), BASIC M

## 2020-06-23 NOTE — TELEPHONE ENCOUNTER
Message left on pharmacy voicemail indicating Eliquis has been approved. Granted date 06/23/2020-06/23/2021.

## 2020-07-12 DIAGNOSIS — E11.9 TYPE 2 DIABETES MELLITUS WITHOUT COMPLICATION, WITHOUT LONG-TERM CURRENT USE OF INSULIN (HCC): ICD-10-CM

## 2020-07-13 RX ORDER — METFORMIN HYDROCHLORIDE 500 MG/1
TABLET, EXTENDED RELEASE ORAL
Qty: 90 TABLET | Refills: 0 | Status: SHIPPED | OUTPATIENT
Start: 2020-07-13 | End: 2021-01-12

## 2020-11-18 ENCOUNTER — LAB ENCOUNTER (OUTPATIENT)
Dept: LAB | Age: 55
End: 2020-11-18
Attending: INTERNAL MEDICINE
Payer: MEDICAID

## 2020-11-18 ENCOUNTER — OFFICE VISIT (OUTPATIENT)
Dept: INTERNAL MEDICINE CLINIC | Facility: CLINIC | Age: 55
End: 2020-11-18
Payer: MEDICAID

## 2020-11-18 VITALS
SYSTOLIC BLOOD PRESSURE: 105 MMHG | HEIGHT: 67 IN | DIASTOLIC BLOOD PRESSURE: 68 MMHG | TEMPERATURE: 98 F | BODY MASS INDEX: 29.98 KG/M2 | WEIGHT: 191 LBS | HEART RATE: 77 BPM

## 2020-11-18 DIAGNOSIS — E66.3 OVERWEIGHT (BMI 25.0-29.9): ICD-10-CM

## 2020-11-18 DIAGNOSIS — E11.9 TYPE 2 DIABETES MELLITUS WITHOUT COMPLICATION, WITHOUT LONG-TERM CURRENT USE OF INSULIN (HCC): Primary | ICD-10-CM

## 2020-11-18 DIAGNOSIS — E11.9 TYPE 2 DIABETES MELLITUS WITHOUT COMPLICATION, WITHOUT LONG-TERM CURRENT USE OF INSULIN (HCC): ICD-10-CM

## 2020-11-18 DIAGNOSIS — Z86.711 HISTORY OF PULMONARY EMBOLUS (PE): ICD-10-CM

## 2020-11-18 DIAGNOSIS — R82.90 ABNORMAL FINDING ON URINALYSIS: ICD-10-CM

## 2020-11-18 PROCEDURE — 36415 COLL VENOUS BLD VENIPUNCTURE: CPT

## 2020-11-18 PROCEDURE — 84450 TRANSFERASE (AST) (SGOT): CPT

## 2020-11-18 PROCEDURE — 81001 URINALYSIS AUTO W/SCOPE: CPT

## 2020-11-18 PROCEDURE — 83036 HEMOGLOBIN GLYCOSYLATED A1C: CPT

## 2020-11-18 PROCEDURE — 3078F DIAST BP <80 MM HG: CPT | Performed by: INTERNAL MEDICINE

## 2020-11-18 PROCEDURE — 99214 OFFICE O/P EST MOD 30 MIN: CPT | Performed by: INTERNAL MEDICINE

## 2020-11-18 PROCEDURE — 87086 URINE CULTURE/COLONY COUNT: CPT

## 2020-11-18 PROCEDURE — 84460 ALANINE AMINO (ALT) (SGPT): CPT

## 2020-11-18 PROCEDURE — 80061 LIPID PANEL: CPT

## 2020-11-18 PROCEDURE — 3008F BODY MASS INDEX DOCD: CPT | Performed by: INTERNAL MEDICINE

## 2020-11-18 PROCEDURE — 80048 BASIC METABOLIC PNL TOTAL CA: CPT

## 2020-11-18 PROCEDURE — 3074F SYST BP LT 130 MM HG: CPT | Performed by: INTERNAL MEDICINE

## 2020-11-18 NOTE — PROGRESS NOTES
HPI:    Patient ID: Ross Polanco is a 63year old female.     HPI  diabetes  Stopped metformin  bec by 5 pm felt very tired  Have not energy  Shopping needed to go home bec tired after 8 hours  While on metformin    Without metformin she always have every Psychiatric/Behavioral: Negative for agitation and behavioral problems. Current Outpatient Medications   Medication Sig Dispense Refill   • apixaban (ELIQUIS) 5 MG Oral Tab Take 1 tablet (5 mg total) by mouth 2 (two) times daily.  180 tablet 0   • wheezes. She has no rales. She exhibits no tenderness. Abdominal: Soft. Bowel sounds are normal. She exhibits no distension and no mass. There is no hepatosplenomegaly. There is no abdominal tenderness. No hernia.    Musculoskeletal:         General: No t

## 2020-12-10 ENCOUNTER — OFFICE VISIT (OUTPATIENT)
Dept: OPHTHALMOLOGY | Facility: CLINIC | Age: 55
End: 2020-12-10
Payer: MEDICAID

## 2020-12-10 DIAGNOSIS — E11.9 TYPE 2 DIABETES MELLITUS WITHOUT RETINOPATHY (HCC): Primary | ICD-10-CM

## 2020-12-10 DIAGNOSIS — H40.003 GLAUCOMA SUSPECT OF BOTH EYES: ICD-10-CM

## 2020-12-10 DIAGNOSIS — H25.13 AGE-RELATED NUCLEAR CATARACT OF BOTH EYES: ICD-10-CM

## 2020-12-10 PROCEDURE — 99243 OFF/OP CNSLTJ NEW/EST LOW 30: CPT | Performed by: OPHTHALMOLOGY

## 2020-12-10 PROCEDURE — 92250 FUNDUS PHOTOGRAPHY W/I&R: CPT | Performed by: OPHTHALMOLOGY

## 2020-12-10 PROCEDURE — 92015 DETERMINE REFRACTIVE STATE: CPT | Performed by: OPHTHALMOLOGY

## 2020-12-10 NOTE — PATIENT INSTRUCTIONS
Type 2 diabetes mellitus without retinopathy ([facility] [address]  Diabetes type II: no background of retinopathy, no signs of neovascularization noted. Discussed ocular and systemic benefits of blood sugar control.   Diagnosis and treatment discussed in detail with basim glaucoma  Closed-angle glaucoma is less common than open-angle. It often comes on quickly. The drainage area in the eye suddenly becomes completely blocked. Eye pressure builds rapidly. You may notice blurred vision and rainbow halos around lights.  You may instructions. Diabetic Retinopathy  What is diabetic retinopathy? Diabetic retinopathy is a leading cause of blindness in American adults.  Changes in the blood vessels of the retina, the light sensitive layer of tissue at the back of the inner eye, change until the disease gets worse. Then you may have blurry or double vision, dark or floating spots, pain or pressure in one or both eyes, rings, flashing lights, or blank spots in your vision.   A condition called macular edema may occur from diabetic r sealing the leaking ones. · Vitrectomy. Vitrectomy is a procedure that involves removing the cloudy, jelly-like substance (vitreous) that fills the center of the eye. the vitreous is replaced with a saline solution. · Injections.  Certain chemicals can be include laser surgery, vitrectomy, and injection of chemicals to stop new blood vessels from forming. · Better control of blood sugar slows the start and progression of retinopathy. It also lessens the need for laser surgery for severe retinopathy.     Nex vision.     What causes diabetic retinopathy? Diabetes is the cause of this eye disease. Over time, diabetes weakens blood vessels all over the body, even in the eyes. Poor blood sugar control can make it worse.  So can:  · Smoking  · High cholesterol  · H Over time, diabetes weakens blood vessels all over the body, even in the eyes. Poor blood sugar control can make it worse.  So can:  · Smoking  · High cholesterol  · High blood pressure  · Pregnancy  This health problem happens more often in Hispanics and i

## 2020-12-10 NOTE — ASSESSMENT & PLAN NOTE
Diabetes type II: no background of retinopathy, no signs of neovascularization noted. Discussed ocular and systemic benefits of blood sugar control. Diagnosis and treatment discussed in detail with patient.   Agree with referral to endocrinologist. Leigha Sotelo

## 2020-12-10 NOTE — PROGRESS NOTES
Diandra Maria is a 63year old female.     HPI:     HPI     Diabetic Eye Exam      Additional comments: Pt has been a diabetic for 2 years       Pt's diabetes is currently controlled by pills (pt stopped taking metformin 3 months ago it because it made her Allergies:  No Known Allergies    ROS:     ROS     Positive for: Endocrine, Eyes    Negative for: Constitutional, Gastrointestinal, Neurological, Skin, Genitourinary, Musculoskeletal, HENT, Cardiovascular, Respiratory, Psychiatric, Allergic/Imm, Heme only                 ASSESSMENT/PLAN:     Diagnoses and Plan:     Type 2 diabetes mellitus without retinopathy ([facility] [address]  Diabetes type II: no background of retinopathy, no signs of neovascularization noted.   Discussed ocular and systemic benefits of blood sug

## 2020-12-15 ENCOUNTER — NURSE ONLY (OUTPATIENT)
Dept: OPHTHALMOLOGY | Facility: CLINIC | Age: 55
End: 2020-12-15
Payer: MEDICAID

## 2020-12-15 ENCOUNTER — TELEPHONE (OUTPATIENT)
Dept: OPHTHALMOLOGY | Facility: CLINIC | Age: 55
End: 2020-12-15

## 2020-12-15 DIAGNOSIS — H40.003 GLAUCOMA SUSPECT OF BOTH EYES: ICD-10-CM

## 2020-12-15 PROCEDURE — 76514 ECHO EXAM OF EYE THICKNESS: CPT | Performed by: OPHTHALMOLOGY

## 2020-12-15 PROCEDURE — 92083 EXTENDED VISUAL FIELD XM: CPT | Performed by: OPHTHALMOLOGY

## 2020-12-15 PROCEDURE — 92133 CPTRZD OPH DX IMG PST SGM ON: CPT | Performed by: OPHTHALMOLOGY

## 2020-12-15 NOTE — PROGRESS NOTES
Alix Riley is a 63year old female.     HPI:     HPI     Here for a VF, OCT and Pachy with no MD.     Last edited by Catalina Lomax O.T. on 12/15/2020  9:07 AM. (History)        Patient History:  Past Medical History:   Diagnosis Date   • Colon adenoma exam.    12/15/2020  Scribed by: James Bartlett MD

## 2021-01-12 ENCOUNTER — OFFICE VISIT (OUTPATIENT)
Dept: OBGYN CLINIC | Facility: CLINIC | Age: 56
End: 2021-01-12
Payer: MEDICAID

## 2021-01-12 VITALS — BODY MASS INDEX: 29 KG/M2 | WEIGHT: 186 LBS | DIASTOLIC BLOOD PRESSURE: 70 MMHG | SYSTOLIC BLOOD PRESSURE: 116 MMHG

## 2021-01-12 DIAGNOSIS — Z01.419 ENCOUNTER FOR GYNECOLOGICAL EXAMINATION WITHOUT ABNORMAL FINDING: ICD-10-CM

## 2021-01-12 DIAGNOSIS — Z12.31 ENCOUNTER FOR SCREENING MAMMOGRAM FOR BREAST CANCER: ICD-10-CM

## 2021-01-12 DIAGNOSIS — N89.8 VAGINAL DISCHARGE: ICD-10-CM

## 2021-01-12 DIAGNOSIS — B96.89 BV (BACTERIAL VAGINOSIS): ICD-10-CM

## 2021-01-12 DIAGNOSIS — Z01.419 ENCOUNTER FOR WELL WOMAN EXAM WITH ROUTINE GYNECOLOGICAL EXAM: Primary | ICD-10-CM

## 2021-01-12 DIAGNOSIS — N76.0 BV (BACTERIAL VAGINOSIS): ICD-10-CM

## 2021-01-12 DIAGNOSIS — N81.11 CYSTOCELE, MIDLINE: ICD-10-CM

## 2021-01-12 PROCEDURE — 99396 PREV VISIT EST AGE 40-64: CPT | Performed by: OBSTETRICS & GYNECOLOGY

## 2021-01-12 PROCEDURE — 3078F DIAST BP <80 MM HG: CPT | Performed by: OBSTETRICS & GYNECOLOGY

## 2021-01-12 PROCEDURE — 3074F SYST BP LT 130 MM HG: CPT | Performed by: OBSTETRICS & GYNECOLOGY

## 2021-01-12 RX ORDER — METRONIDAZOLE 500 MG/1
500 TABLET ORAL 2 TIMES DAILY
Qty: 14 TABLET | Refills: 0 | Status: SHIPPED | OUTPATIENT
Start: 2021-01-12 | End: 2021-01-19

## 2021-01-12 NOTE — PROGRESS NOTES
HPI:    Patient ID: Brooke Kim is a 63year old female. Patient here for routine exam.  Reports vaginal odor but no discharge. Also reports a combination of urge incontinence and stress incontinence.   Discussed being evaluated by UroGynecology with Lymphadenopathy:     She has no cervical adenopathy. Neurological: She is alert and oriented to person, place, and time. Skin: Skin is warm and dry. Psychiatric: She has a normal mood and affect.  Her behavior is normal. Judgment and thought content

## 2021-01-14 LAB
HPV I/H RISK 1 DNA SPEC QL NAA+PROBE: NEGATIVE
TRICHOMONAS SCREEN: NEGATIVE

## 2021-03-17 ENCOUNTER — OFFICE VISIT (OUTPATIENT)
Dept: ENDOCRINOLOGY CLINIC | Facility: CLINIC | Age: 56
End: 2021-03-17
Payer: MEDICAID

## 2021-03-17 VITALS
WEIGHT: 187 LBS | SYSTOLIC BLOOD PRESSURE: 111 MMHG | DIASTOLIC BLOOD PRESSURE: 78 MMHG | BODY MASS INDEX: 29 KG/M2 | HEART RATE: 76 BPM

## 2021-03-17 DIAGNOSIS — E11.65 UNCONTROLLED TYPE 2 DIABETES MELLITUS WITH HYPERGLYCEMIA (HCC): Primary | ICD-10-CM

## 2021-03-17 LAB
CARTRIDGE LOT#: ABNORMAL NUMERIC
GLUCOSE BLOOD: 228
HEMOGLOBIN A1C: 10 % (ref 4.3–5.6)
TEST STRIP LOT #: NORMAL NUMERIC

## 2021-03-17 PROCEDURE — 99204 OFFICE O/P NEW MOD 45 MIN: CPT | Performed by: INTERNAL MEDICINE

## 2021-03-17 PROCEDURE — 83036 HEMOGLOBIN GLYCOSYLATED A1C: CPT | Performed by: INTERNAL MEDICINE

## 2021-03-17 PROCEDURE — 82947 ASSAY GLUCOSE BLOOD QUANT: CPT | Performed by: INTERNAL MEDICINE

## 2021-03-17 PROCEDURE — 3046F HEMOGLOBIN A1C LEVEL >9.0%: CPT | Performed by: INTERNAL MEDICINE

## 2021-03-17 PROCEDURE — 3074F SYST BP LT 130 MM HG: CPT | Performed by: INTERNAL MEDICINE

## 2021-03-17 PROCEDURE — 3078F DIAST BP <80 MM HG: CPT | Performed by: INTERNAL MEDICINE

## 2021-03-17 PROCEDURE — 36416 COLLJ CAPILLARY BLOOD SPEC: CPT | Performed by: INTERNAL MEDICINE

## 2021-03-17 RX ORDER — LINAGLIPTIN 5 MG/1
5 TABLET, FILM COATED ORAL DAILY
Qty: 30 TABLET | Refills: 5 | Status: SHIPPED | OUTPATIENT
Start: 2021-03-17 | End: 2021-11-23

## 2021-03-17 RX ORDER — GLIPIZIDE 10 MG/1
10 TABLET ORAL
Qty: 60 TABLET | Refills: 3 | Status: SHIPPED | OUTPATIENT
Start: 2021-03-17

## 2021-03-17 NOTE — PROGRESS NOTES
Name: Zuleima Erma  Date: 3/17/2021    Referring Physician: Amor Santoyo is a 61year old female who presents for diabetes mellitus, diagnosed approximately 2 years ago.       Prior HbA, C or glycohemoglobin Difficulty of Paying Living Expenses:   Food Insecurity:       Worried About [address] in the Last Year:       Ran Out of Food in the Last Year:   Transportation Needs:       Lack of Transportation (Medical):       Lack of Transportation (Non-Med onchomycosis, no skin breakage  Psychiatric:  oriented to time, self, and place  Nutritional:  no abnormal weight gain or loss    ASSESSMENT/PLAN:      1.  Diabetes Mellitus Type 2, Uncontrolled  -Uncontrolled  -Discussed importance of glycemic control to p

## 2021-03-19 ENCOUNTER — TELEMEDICINE (OUTPATIENT)
Dept: INTERNAL MEDICINE CLINIC | Facility: CLINIC | Age: 56
End: 2021-03-19

## 2021-03-19 DIAGNOSIS — E11.9 TYPE 2 DIABETES MELLITUS WITHOUT COMPLICATION, WITHOUT LONG-TERM CURRENT USE OF INSULIN (HCC): ICD-10-CM

## 2021-03-19 DIAGNOSIS — Z79.01 CHRONIC ANTICOAGULATION: ICD-10-CM

## 2021-03-19 DIAGNOSIS — Z86.711 HISTORY OF PULMONARY EMBOLUS (PE): ICD-10-CM

## 2021-03-19 PROCEDURE — 99213 OFFICE O/P EST LOW 20 MIN: CPT | Performed by: INTERNAL MEDICINE

## 2021-03-19 NOTE — PROGRESS NOTES
HPI:    Patient ID: Sue Du is a 61year old female. HPI  Virtual Telephone Check-In    Sue Du verbally consents to a Virtual/Telephone Check-In visit on 03/20/21.   Patient has been referred to the [facility] website at [email or web address] t A1C 8.4 (H) 11/18/2020    A1C 7.0 (H) 01/17/2020     (H) 11/18/2020         Review of Systems   Constitutional: Negative. HENT: Negative. Respiratory: Negative. Cardiovascular: Negative. Gastrointestinal: Negative.     Genitourinary: Leafy Frank in accordance with the CDC recommendations, especially for those that are healthcare providers, essential workers or in high-risk categories.       Our hospital system received 2 shipments of vaccine so far which were used to vaccinate our healthcare worker

## 2021-03-20 PROBLEM — N76.0 BV (BACTERIAL VAGINOSIS): Status: RESOLVED | Noted: 2021-01-12 | Resolved: 2021-03-20

## 2021-03-20 PROBLEM — B96.89 BV (BACTERIAL VAGINOSIS): Status: RESOLVED | Noted: 2021-01-12 | Resolved: 2021-03-20

## 2021-03-22 ENCOUNTER — PATIENT MESSAGE (OUTPATIENT)
Dept: INTERNAL MEDICINE CLINIC | Facility: CLINIC | Age: 56
End: 2021-03-22

## 2021-03-22 NOTE — TELEPHONE ENCOUNTER
From: Faith Cisneros  To: Ena Adan MD  Sent: 3/22/2021 2:02 PM CDT  Subject: Prescription Question    I’m in desperate need of Accu-Chek Glucose Strips, I went to purchase and they are rather expensive Pharmacist said to check with my doctor.   Also,

## 2021-03-23 RX ORDER — BLOOD-GLUCOSE METER
1 EACH MISCELLANEOUS DAILY
Qty: 1 KIT | Refills: 0 | Status: SHIPPED | OUTPATIENT
Start: 2021-03-23 | End: 2021-03-25

## 2021-03-23 RX ORDER — BLOOD SUGAR DIAGNOSTIC
STRIP MISCELLANEOUS
Qty: 100 STRIP | Refills: 5 | Status: SHIPPED | OUTPATIENT
Start: 2021-03-23 | End: 2021-03-25

## 2021-03-23 NOTE — TELEPHONE ENCOUNTER
Orders pended and routed to provider to confirm how often should the patient check her blood glucose levels.

## 2021-03-24 ENCOUNTER — IMMUNIZATION (OUTPATIENT)
Dept: LAB | Age: 56
End: 2021-03-24
Attending: HOSPITALIST
Payer: MEDICAID

## 2021-03-24 DIAGNOSIS — Z23 NEED FOR VACCINATION: Primary | ICD-10-CM

## 2021-03-24 PROCEDURE — 0001A SARSCOV2 VAC 30MCG/0.3ML IM: CPT

## 2021-03-25 ENCOUNTER — TELEPHONE (OUTPATIENT)
Dept: INTERNAL MEDICINE CLINIC | Facility: CLINIC | Age: 56
End: 2021-03-25

## 2021-03-25 RX ORDER — LANCETS 33 GAUGE
1 EACH MISCELLANEOUS DAILY
Qty: 100 EACH | Refills: 3 | Status: SHIPPED | OUTPATIENT
Start: 2021-03-25

## 2021-03-25 RX ORDER — BLOOD-GLUCOSE METER
EACH MISCELLANEOUS
Qty: 1 KIT | Refills: 0 | Status: SHIPPED | OUTPATIENT
Start: 2021-03-25

## 2021-03-25 RX ORDER — BLOOD SUGAR DIAGNOSTIC
STRIP MISCELLANEOUS
Qty: 100 STRIP | Refills: 0 | Status: SHIPPED | OUTPATIENT
Start: 2021-03-25 | End: 2021-04-23

## 2021-03-25 NOTE — TELEPHONE ENCOUNTER
Pharmacy Notes:    Does Lori have an Accu-Chek Alice meter? If so, why ordering strips? Patient has Medicaid-[employer] and [employer] not covered. Please order One Touch Ultra.     •  Glucose Blood (ACCU-CHEK ALICE PLUS) In Vitro Strip, Use

## 2021-04-01 ENCOUNTER — DIABETIC EDUCATION (OUTPATIENT)
Dept: ENDOCRINOLOGY CLINIC | Facility: CLINIC | Age: 56
End: 2021-04-01
Payer: MEDICAID

## 2021-04-01 DIAGNOSIS — E11.9 TYPE 2 DIABETES MELLITUS WITHOUT RETINOPATHY (HCC): Primary | ICD-10-CM

## 2021-04-01 PROCEDURE — 99211 OFF/OP EST MAY X REQ PHY/QHP: CPT

## 2021-04-01 NOTE — PROGRESS NOTES
Herbert Lipscomb  : 1965 attended individual initial assessment for Diabetes Education:    Date: 2021   Start time: 12:30 pm End time:1:45 pm    HEMOGLOBIN A1C (%)   Date Value   2021 10.0 (A)        Assessment: 61year old female who has currently testing BG at home 2-3 times daily and will continue to do so fasting as well as post prandially and at HS.       Problem Solving: Prevention, detection and treatment of acute complications: taught symptoms of hypoglycemia, hyperglycemia, how to t

## 2021-04-14 ENCOUNTER — IMMUNIZATION (OUTPATIENT)
Dept: LAB | Age: 56
End: 2021-04-14
Attending: HOSPITALIST
Payer: MEDICAID

## 2021-04-14 DIAGNOSIS — Z23 NEED FOR VACCINATION: Primary | ICD-10-CM

## 2021-04-14 PROCEDURE — 0002A SARSCOV2 VAC 30MCG/0.3ML IM: CPT

## 2021-04-22 ENCOUNTER — NURSE ONLY (OUTPATIENT)
Dept: ENDOCRINOLOGY CLINIC | Facility: CLINIC | Age: 56
End: 2021-04-22
Payer: MEDICAID

## 2021-04-22 DIAGNOSIS — E11.9 TYPE 2 DIABETES MELLITUS WITHOUT RETINOPATHY (HCC): ICD-10-CM

## 2021-04-22 PROCEDURE — 99211 OFF/OP EST MAY X REQ PHY/QHP: CPT | Performed by: DIETITIAN, REGISTERED

## 2021-04-23 RX ORDER — BLOOD SUGAR DIAGNOSTIC
STRIP MISCELLANEOUS
Qty: 100 STRIP | Refills: 0 | Status: SHIPPED | OUTPATIENT
Start: 2021-04-23 | End: 2021-05-10

## 2021-04-26 NOTE — PROGRESS NOTES
Lucrecia Ordonez  DOB2/25/1965 attended Step 2 Class: Pathophysiology of Diabetes, Types of Diabetes, Sources of Carbohydrate, Exercise, Blood Glucose Targets   Due to COVID-19 ACTION PLAN, the patient's office visit was conducted via real-time interactive au provided for all areas covered.   Recommendation: attend Step 3 Class    Kelly Mendiola RN, CDE

## 2021-04-29 ENCOUNTER — NURSE ONLY (OUTPATIENT)
Dept: ENDOCRINOLOGY CLINIC | Facility: CLINIC | Age: 56
End: 2021-04-29
Payer: MEDICAID

## 2021-04-29 DIAGNOSIS — E11.9 TYPE 2 DIABETES MELLITUS WITHOUT RETINOPATHY (HCC): Primary | ICD-10-CM

## 2021-04-29 PROCEDURE — 99211 OFF/OP EST MAY X REQ PHY/QHP: CPT

## 2021-04-29 NOTE — PROGRESS NOTES
Viola Jose  DOB2/25/1965 attended Step 3 Group Diabetes Education Class:  Due to COVID-19 ACTION PLAN, the patient's office visit was conducted via real-time interactive audio and video.      The patient verbally consents to an audio and video consultati implementing carbohydrate counting and continue dietary tracking. Written materials provided for all areas covered. Patient verbalized understanding and has no further questions at this time.     Manuel Campo RN/ Solis Mccloud RN

## 2021-05-06 ENCOUNTER — NURSE ONLY (OUTPATIENT)
Dept: ENDOCRINOLOGY CLINIC | Facility: CLINIC | Age: 56
End: 2021-05-06
Payer: MEDICAID

## 2021-05-06 DIAGNOSIS — E11.9 TYPE 2 DIABETES MELLITUS WITHOUT RETINOPATHY (HCC): ICD-10-CM

## 2021-05-06 PROCEDURE — 99211 OFF/OP EST MAY X REQ PHY/QHP: CPT | Performed by: DIETITIAN, REGISTERED

## 2021-05-10 RX ORDER — BLOOD SUGAR DIAGNOSTIC
1 STRIP MISCELLANEOUS DAILY
Qty: 100 EACH | Refills: 3 | Status: SHIPPED | OUTPATIENT
Start: 2021-05-10 | End: 2021-05-11

## 2021-05-11 ENCOUNTER — PATIENT MESSAGE (OUTPATIENT)
Dept: INTERNAL MEDICINE CLINIC | Facility: CLINIC | Age: 56
End: 2021-05-11

## 2021-05-11 NOTE — PROGRESS NOTES
Jb Cortes  DOB2/25/1965 attended Step 4 Class: Complications, Special Occasion Eating  Telehealth outside of [address] Verbal Consent   A telehealth visit was conducted with Jb Cortes today which was completed using two-way, real-time int Coping  Discuss support plan, stress reduction and diabetes distress    The patient verbalized understanding and has no further questions at this time  Written materials provided for all areas covered.   Recommendation: attend Step 5 Class    Eugenia Wang

## 2021-05-12 RX ORDER — BLOOD SUGAR DIAGNOSTIC
1 STRIP MISCELLANEOUS DAILY
Qty: 100 EACH | Refills: 3 | Status: SHIPPED | OUTPATIENT
Start: 2021-05-12

## 2021-05-12 NOTE — TELEPHONE ENCOUNTER
From: Mervat Pandey  To: Nikhil Robbins MD  Sent: 5/11/2021 7:19 PM CDT  Subject: Non-Urgent Medical Question    Hello, I’m in desperate need of the “OneTouch strips” I didn’t realize the current prescription is for 1-testing a day.  I check my glucose 3x

## 2021-06-04 ENCOUNTER — HOSPITAL ENCOUNTER (OUTPATIENT)
Dept: MAMMOGRAPHY | Age: 56
Discharge: HOME | End: 2021-06-04
Attending: OBSTETRICS & GYNECOLOGY
Payer: MEDICAID

## 2021-06-04 DIAGNOSIS — Z12.31 ENCOUNTER FOR SCREENING MAMMOGRAM FOR BREAST CANCER: ICD-10-CM

## 2021-06-04 PROCEDURE — 77067 SCR MAMMO BI INCL CAD: CPT | Performed by: OBSTETRICS & GYNECOLOGY

## 2021-06-04 PROCEDURE — 77063 BREAST TOMOSYNTHESIS BI: CPT | Performed by: OBSTETRICS & GYNECOLOGY

## 2021-06-17 ENCOUNTER — NURSE ONLY (OUTPATIENT)
Dept: ENDOCRINOLOGY CLINIC | Facility: CLINIC | Age: 56
End: 2021-06-17
Payer: MEDICAID

## 2021-06-17 DIAGNOSIS — E11.9 TYPE 2 DIABETES MELLITUS WITHOUT RETINOPATHY (HCC): ICD-10-CM

## 2021-06-17 PROCEDURE — 99211 OFF/OP EST MAY X REQ PHY/QHP: CPT | Performed by: DIETITIAN, REGISTERED

## 2021-06-21 VITALS — WEIGHT: 187 LBS | BODY MASS INDEX: 29 KG/M2

## 2021-06-21 VITALS — BODY MASS INDEX: 29 KG/M2 | WEIGHT: 187 LBS

## 2021-06-23 ENCOUNTER — TELEPHONE (OUTPATIENT)
Dept: OBGYN CLINIC | Facility: CLINIC | Age: 56
End: 2021-06-23

## 2021-06-23 ENCOUNTER — HOSPITAL ENCOUNTER (OUTPATIENT)
Dept: ULTRASOUND IMAGING | Facility: HOSPITAL | Age: 56
Discharge: HOME | End: 2021-06-23
Attending: OBSTETRICS & GYNECOLOGY
Payer: MEDICAID

## 2021-06-23 ENCOUNTER — HOSPITAL ENCOUNTER (OUTPATIENT)
Dept: MAMMOGRAPHY | Facility: HOSPITAL | Age: 56
Discharge: HOME | End: 2021-06-23
Attending: OBSTETRICS & GYNECOLOGY
Payer: MEDICAID

## 2021-06-23 DIAGNOSIS — R92.8 ABNORMAL MAMMOGRAM: ICD-10-CM

## 2021-06-23 PROCEDURE — 76642 ULTRASOUND BREAST LIMITED: CPT | Performed by: OBSTETRICS & GYNECOLOGY

## 2021-06-23 PROCEDURE — 77061 BREAST TOMOSYNTHESIS UNI: CPT | Performed by: OBSTETRICS & GYNECOLOGY

## 2021-06-23 PROCEDURE — 77065 DX MAMMO INCL CAD UNI: CPT | Performed by: OBSTETRICS & GYNECOLOGY

## 2021-06-23 NOTE — TELEPHONE ENCOUNTER
[employer] message sent to pt.    ----- Message from Ananth Crowell MD sent at 6/23/2021  3:05 PM CDT -----  Normal mammogram.  Notify patient.

## 2021-07-27 ENCOUNTER — NURSE ONLY (OUTPATIENT)
Dept: ENDOCRINOLOGY CLINIC | Facility: CLINIC | Age: 56
End: 2021-07-27
Payer: MEDICAID

## 2021-07-27 DIAGNOSIS — E11.9 TYPE 2 DIABETES MELLITUS WITHOUT RETINOPATHY (HCC): Primary | ICD-10-CM

## 2021-07-27 PROCEDURE — 99211 OFF/OP EST MAY X REQ PHY/QHP: CPT | Performed by: DIETITIAN, REGISTERED

## 2021-07-28 NOTE — PROGRESS NOTES
Pamela Mitchell  DOB2/25/1965 attended Step 5 Group Class: Heart Healthy Diet, Exercise, Stress Management    Date: 7/27/2021  Referring Provider: Dr. Ulysses Panda  Start time: 5:30 End time: 7:30    Due to COVID-19 ACTION PLAN, the patient's office visit

## 2021-08-05 ENCOUNTER — TELEPHONE (OUTPATIENT)
Dept: INTERNAL MEDICINE CLINIC | Facility: CLINIC | Age: 56
End: 2021-08-05

## 2021-08-05 NOTE — TELEPHONE ENCOUNTER
Received a fax for a prior authorization request on eliquis    Prior authorization for eliquis has been initiated through [email or web address] using keycode: [id number] It takes about 1-5 business days for a decision to come back.

## 2021-08-06 NOTE — TELEPHONE ENCOUNTER
Prior authorization for eliquis has been approved from 5/6/21 through 8/6/22 pharmacy has been notified.

## 2021-09-20 ENCOUNTER — TELEPHONE (OUTPATIENT)
Dept: INTERNAL MEDICINE CLINIC | Facility: CLINIC | Age: 56
End: 2021-09-20

## 2021-09-20 NOTE — TELEPHONE ENCOUNTER
Patient dropped off [employer] form to be filled out by MD. Pt requesting form to be completes as soon as possible, in order for her to make her appt with her dentist.   Please call pt when form is completed. Form dropped off in [place] location.

## 2021-11-23 RX ORDER — LINAGLIPTIN 5 MG/1
TABLET, FILM COATED ORAL
Qty: 30 TABLET | Refills: 0 | Status: SHIPPED | OUTPATIENT
Start: 2021-11-23 | End: 2022-01-03

## 2021-11-23 NOTE — TELEPHONE ENCOUNTER
LOV: 03/17/21 RTC 3 months  Refilled for 30 days supply per protocol. RN also sent [employer] message to make an appointment.

## 2021-11-24 ENCOUNTER — OFFICE VISIT (OUTPATIENT)
Dept: INTERNAL MEDICINE CLINIC | Facility: CLINIC | Age: 56
End: 2021-11-24
Payer: MEDICAID

## 2021-11-24 VITALS
BODY MASS INDEX: 29.89 KG/M2 | WEIGHT: 186 LBS | HEIGHT: 66 IN | HEART RATE: 89 BPM | DIASTOLIC BLOOD PRESSURE: 64 MMHG | SYSTOLIC BLOOD PRESSURE: 100 MMHG

## 2021-11-24 DIAGNOSIS — Z86.711 HISTORY OF PULMONARY EMBOLUS (PE): ICD-10-CM

## 2021-11-24 DIAGNOSIS — Z78.0 POSTMENOPAUSAL: ICD-10-CM

## 2021-11-24 DIAGNOSIS — Z12.11 COLON CANCER SCREENING: ICD-10-CM

## 2021-11-24 DIAGNOSIS — Z12.31 VISIT FOR SCREENING MAMMOGRAM: ICD-10-CM

## 2021-11-24 DIAGNOSIS — E11.9 TYPE 2 DIABETES MELLITUS WITHOUT COMPLICATION, WITHOUT LONG-TERM CURRENT USE OF INSULIN (HCC): Primary | ICD-10-CM

## 2021-11-24 PROCEDURE — 99214 OFFICE O/P EST MOD 30 MIN: CPT | Performed by: INTERNAL MEDICINE

## 2021-11-24 PROCEDURE — 3078F DIAST BP <80 MM HG: CPT | Performed by: INTERNAL MEDICINE

## 2021-11-24 PROCEDURE — 3008F BODY MASS INDEX DOCD: CPT | Performed by: INTERNAL MEDICINE

## 2021-11-24 PROCEDURE — 3074F SYST BP LT 130 MM HG: CPT | Performed by: INTERNAL MEDICINE

## 2021-11-24 NOTE — PROGRESS NOTES
HPI:    Patient ID: Luc Vega is a 61year old female. HPI    Diabetes  Diabetes  Follow upvisit. type 2 diabetes mellitus.    There are no hypoglycemic associated symptoms  Denies   blurred vision, chest paino   No paresthesiasfoot ulcerations, Negative for adenopathy. Does not bruise/bleed easily. Psychiatric/Behavioral: Negative for agitation and behavioral problems.          Current Outpatient Medications   Medication Sig Dispense Refill   • TRADJENTA 5 MG Oral Tab TAKE ONE TABLET BY MOUTH ON Effort: Pulmonary effort is normal.      Breath sounds: Normal breath sounds. Abdominal:      General: Bowel sounds are normal.      Palpations: Abdomen is soft. Skin:     General: Skin is warm and dry.    Neurological:      Mental Status: She is a A1C      Urine Microscopic w Reflex CULTURE      Occult Blood, Fecal, FIT Immunoassay      Meds This Visit:  Requested Prescriptions      No prescriptions requested or ordered in this encounter       Imaging & Referrals:  [facility] - INTERNAL  AUDREY SCREENING BILA

## 2021-12-06 PROBLEM — E11.9 TYPE 2 DIABETES MELLITUS WITHOUT RETINOPATHY (HCC): Status: RESOLVED | Noted: 2020-12-10 | Resolved: 2021-12-06

## 2021-12-30 ENCOUNTER — HOSPITAL ENCOUNTER (OUTPATIENT)
Age: 56
Discharge: HOME | End: 2021-12-30
Payer: MEDICAID

## 2021-12-30 VITALS
SYSTOLIC BLOOD PRESSURE: 133 MMHG | TEMPERATURE: 97 F | DIASTOLIC BLOOD PRESSURE: 76 MMHG | RESPIRATION RATE: 18 BRPM | HEART RATE: 77 BPM | OXYGEN SATURATION: 99 %

## 2021-12-30 DIAGNOSIS — N30.00 ACUTE CYSTITIS WITHOUT HEMATURIA: Primary | ICD-10-CM

## 2021-12-30 LAB
BILIRUB UR QL STRIP: NEGATIVE
COLOR UR: YELLOW
GLUCOSE UR STRIP-MCNC: NEGATIVE MG/DL
KETONES UR STRIP-MCNC: NEGATIVE MG/DL
NITRITE UR QL STRIP: NEGATIVE
PH UR STRIP: 5.5 [PH]
PROT UR STRIP-MCNC: 100 MG/DL
SP GR UR STRIP: 1.02
UROBILINOGEN UR STRIP-ACNC: <2 MG/DL

## 2021-12-30 PROCEDURE — 87186 SC STD MICRODIL/AGAR DIL: CPT | Performed by: PHYSICIAN ASSISTANT

## 2021-12-30 PROCEDURE — 99203 OFFICE O/P NEW LOW 30 MIN: CPT

## 2021-12-30 PROCEDURE — 87086 URINE CULTURE/COLONY COUNT: CPT | Performed by: PHYSICIAN ASSISTANT

## 2021-12-30 PROCEDURE — 99213 OFFICE O/P EST LOW 20 MIN: CPT

## 2021-12-30 PROCEDURE — 87077 CULTURE AEROBIC IDENTIFY: CPT | Performed by: PHYSICIAN ASSISTANT

## 2021-12-30 PROCEDURE — 81002 URINALYSIS NONAUTO W/O SCOPE: CPT

## 2021-12-30 RX ORDER — CEPHALEXIN 500 MG/1
500 CAPSULE ORAL 2 TIMES DAILY
Qty: 20 CAPSULE | Refills: 0 | Status: SHIPPED | OUTPATIENT
Start: 2021-12-30 | End: 2022-01-09

## 2021-12-31 NOTE — ED PROVIDER NOTES
Patient Seen in: [facility]      History   Patient presents with:  Painful Urination  Urinary Symptoms    Stated Complaint: UTI    Subjective:   HPI    58-year-old female here for dysuria, frequency and burning for the last 5 days.   She denie Effort: Pulmonary effort is normal.   Abdominal:      General: Abdomen is flat. Musculoskeletal:         General: Normal range of motion. Cervical back: Normal range of motion. Skin:     General: Skin is warm.    Neurological:      General: No foca

## 2021-12-31 NOTE — ED INITIAL ASSESSMENT (HPI)
Patient reports having pain with urination along with frequent urination since 12/25. Patient denies any fevers.

## 2022-01-03 RX ORDER — LINAGLIPTIN 5 MG/1
5 TABLET, FILM COATED ORAL DAILY
Qty: 30 TABLET | Refills: 0 | Status: SHIPPED | OUTPATIENT
Start: 2022-01-03

## 2022-01-14 ENCOUNTER — TELEMEDICINE (OUTPATIENT)
Dept: INTERNAL MEDICINE CLINIC | Facility: CLINIC | Age: 57
End: 2022-01-14
Payer: MEDICAID

## 2022-01-14 DIAGNOSIS — N39.0 URINARY TRACT INFECTION WITHOUT HEMATURIA, SITE UNSPECIFIED: Primary | ICD-10-CM

## 2022-01-14 PROCEDURE — 99213 OFFICE O/P EST LOW 20 MIN: CPT | Performed by: INTERNAL MEDICINE

## 2022-01-14 RX ORDER — NITROFURANTOIN 25; 75 MG/1; MG/1
100 CAPSULE ORAL 2 TIMES DAILY
Qty: 20 CAPSULE | Refills: 0 | Status: SHIPPED | OUTPATIENT
Start: 2022-01-14

## 2022-01-14 NOTE — PROGRESS NOTES
HPI:    Patient ID: Berhane Smith is a 61year old female. HPI  Virtual Telephone Check-In    Berhane Smith verbally consents to a Virtual/Telephone Check-In visit on 01/14/22.   Patient has been referred to the [facility] website at [email or web address] t Outpatient Medications   Medication Sig Dispense Refill   • nitrofurantoin monohydrate macro (MACROBID) 100 MG Oral Cap Take 1 capsule (100 mg total) by mouth 2 (two) times daily.  20 capsule 0   • linagliptin (TRADJENTA) 5 mg Oral Tab Take 1 tablet (5 mg t Pt not in pain  No distress         ASSESSMENT/PLAN:   (N39.0) Urinary tract infection without hematuria, site unspecified  (primary encounter diagnosis)  Plan: URINE CULTURE, ROUTINE        Treat with nitrofurantoin  Repeat urinalysis prior to staring med

## 2022-01-18 LAB
ALBUMIN/GLOBULIN RATIO: 1.2 (CALC) (ref 1–2.5)
ALBUMIN: 4.1 G/DL (ref 3.6–5.1)
ALKALINE PHOSPHATASE: 72 U/L (ref 37–153)
ALT: 15 U/L (ref 6–29)
AST: 13 U/L (ref 10–35)
BILIRUBIN, TOTAL: 0.4 MG/DL (ref 0.2–1.2)
BUN: 16 MG/DL (ref 7–25)
CALCIUM: 9.7 MG/DL (ref 8.6–10.4)
CARBON DIOXIDE: 27 MMOL/L (ref 20–32)
CHLORIDE: 103 MMOL/L (ref 98–110)
CHOL/HDLC RATIO: 2.5 (CALC)
CHOLESTEROL, TOTAL: 140 MG/DL
CREATININE: 0.79 MG/DL (ref 0.5–1.05)
EGFR IF AFRICN AM: 96 ML/MIN/1.73M2
EGFR IF NONAFRICN AM: 83 ML/MIN/1.73M2
GLOBULIN: 3.3 G/DL (CALC) (ref 1.9–3.7)
GLUCOSE: 135 MG/DL (ref 65–99)
HDL CHOLESTEROL: 57 MG/DL
HEMATOCRIT: 38 % (ref 35–45)
HEMOGLOBIN A1C: 7.5 % OF TOTAL HGB
HEMOGLOBIN: 12.3 G/DL (ref 11.7–15.5)
LDL-CHOLESTEROL: 68 MG/DL (CALC)
MCH: 26.4 PG (ref 27–33)
MCHC: 32.4 G/DL (ref 32–36)
MCV: 81.5 FL (ref 80–100)
MPV: 10.2 FL (ref 7.5–12.5)
NON-HDL CHOLESTEROL: 83 MG/DL (CALC)
PLATELET COUNT: 267 THOUSAND/UL (ref 140–400)
POTASSIUM: 4.7 MMOL/L (ref 3.5–5.3)
PROTEIN, TOTAL: 7.4 G/DL (ref 6.1–8.1)
RDW: 13.1 % (ref 11–15)
RED BLOOD CELL COUNT: 4.66 MILLION/UL (ref 3.8–5.1)
SODIUM: 141 MMOL/L (ref 135–146)
T4, FREE: 1 NG/DL (ref 0.8–1.8)
TRIGLYCERIDES: 69 MG/DL
TSH: 1.94 MIU/L (ref 0.4–4.5)
WHITE BLOOD CELL COUNT: 5.4 THOUSAND/UL (ref 3.8–10.8)

## 2022-02-21 NOTE — TELEPHONE ENCOUNTER
LOV: 3/17/21      RTC: 3 Months      FU: 5/14/22      1 Month Supply Pending      Called and helped schedule an appt for May

## 2022-02-22 RX ORDER — LINAGLIPTIN 5 MG/1
TABLET, FILM COATED ORAL
Qty: 90 TABLET | Refills: 0 | Status: SHIPPED | OUTPATIENT
Start: 2022-02-22

## 2022-02-22 NOTE — TELEPHONE ENCOUNTER
Refilled for 90 days, enough supply to last her till upcoming appointment.      Future Appointments   Date Time Provider Nia Wills   5/14/2022  9:45 AM Corrie Nolan MD [facility]

## 2022-05-03 ENCOUNTER — TELEPHONE (OUTPATIENT)
Dept: GASTROENTEROLOGY | Facility: CLINIC | Age: 57
End: 2022-05-03

## 2022-05-03 NOTE — TELEPHONE ENCOUNTER
----- Message from Elizabeth Erazo, [address] sent at 7/2/2019  1:20 PM CDT -----  Regarding: recall colon  Recall colon for 3 years per . Last colon done 7/1/19

## 2022-05-14 ENCOUNTER — OFFICE VISIT (OUTPATIENT)
Dept: ENDOCRINOLOGY CLINIC | Facility: CLINIC | Age: 57
End: 2022-05-14
Payer: COMMERCIAL

## 2022-05-14 VITALS
HEART RATE: 80 BPM | WEIGHT: 188 LBS | DIASTOLIC BLOOD PRESSURE: 70 MMHG | SYSTOLIC BLOOD PRESSURE: 109 MMHG | BODY MASS INDEX: 30 KG/M2

## 2022-05-14 DIAGNOSIS — E11.65 UNCONTROLLED TYPE 2 DIABETES MELLITUS WITH HYPERGLYCEMIA (HCC): Primary | ICD-10-CM

## 2022-05-14 LAB
CARTRIDGE LOT#: ABNORMAL NUMERIC
GLUCOSE BLOOD: 197
HEMOGLOBIN A1C: 7.7 % (ref 4.3–5.6)
TEST STRIP LOT #: NORMAL NUMERIC

## 2022-05-14 RX ORDER — LINAGLIPTIN 5 MG/1
5 TABLET, FILM COATED ORAL DAILY
Qty: 90 TABLET | Refills: 3 | Status: SHIPPED | OUTPATIENT
Start: 2022-05-14

## 2022-05-14 RX ORDER — GLIPIZIDE 10 MG/1
10 TABLET ORAL
Qty: 180 TABLET | Refills: 3 | Status: SHIPPED | OUTPATIENT
Start: 2022-05-14

## 2022-05-14 NOTE — PATIENT INSTRUCTIONS
Glipizide helps your body make more insulin and Tradjenta helps your body use the insulin and digest the sugars you eat

## 2022-05-16 ENCOUNTER — TELEPHONE (OUTPATIENT)
Dept: ENDOCRINOLOGY CLINIC | Facility: CLINIC | Age: 57
End: 2022-05-16

## 2022-05-17 NOTE — TELEPHONE ENCOUNTER
Per [facility] rx needs pa    Medication PA Requested:      tradjenta  5 mg                                                   CoverMyMeds Used:  Key:  [id number]  Quantity: 30   Day Supply: 28  Sig: take 1 tablet by mouth daily  DX Code:                                     CPT code (if applicable):   Case Number/Pending Ref#:

## 2022-05-20 NOTE — TELEPHONE ENCOUNTER
Medication PA Requested:      tradjenta  5 mg                                                   CoverMyMeds Used:  Key:  [id number]  Quantity: 30         Day Supply: 28  Sig: take 1 tablet by mouth daily  DX Code:      Faxed [employer] form with OV 5/14/2022 and a1c  Awaiting determnation.

## 2022-05-26 NOTE — TELEPHONE ENCOUNTER
[place] [facility], [phone] spoke with Jonah Mcknight. She states PA for Tradjenta was never received. Reviewed fax # on confirmation sheet. She states to fax to [phone]. Call ref # [id number]    Refaxed PA form with clinicals as above.

## 2022-05-27 NOTE — TELEPHONE ENCOUNTER
Fax received from [address] dated 05/23/22 stating   \"Unable to locate patient with information provided\"    PA was refaxed recently, waiting for response. Routed as FYI.

## 2022-05-27 NOTE — TELEPHONE ENCOUNTER
[place] [facility], [phone] spoke with Mehdi Greco. Inquired if received faxed PA info from yesterday. She states they are behind on faxes and can perform PA on phone. Answered clinical questions and she states  preferred meds Januvia and Janumet. She submitted PA for review, states not to refax clinical since already sent. Takes 5 business days for review. Case #[id number]. Call ref # Chris Bullard 5/27/2022 12:04est     Awaiting determination.

## 2022-06-03 NOTE — TELEPHONE ENCOUNTER
[place] [facility], [phone] spoke with Jordon Bowers. Inquired status of Case #[id number]. She states Hope Councilman was denied, Letter with denial rationale will be refaxed to [phone].   Call ref # [id number]

## 2022-06-17 RX ORDER — LINAGLIPTIN 5 MG/1
5 TABLET, FILM COATED ORAL DAILY
Qty: 90 TABLET | Refills: 0 | Status: SHIPPED | OUTPATIENT
Start: 2022-06-17

## 2022-06-30 RX ORDER — LINAGLIPTIN 5 MG/1
5 TABLET, FILM COATED ORAL DAILY
Qty: 90 TABLET | Refills: 0 | OUTPATIENT
Start: 2022-06-30

## 2022-06-30 NOTE — TELEPHONE ENCOUNTER
Please see 6/28/22 encounter. Last Rx for trajendta sent on 6.17.22 but per patient, her insurance does not cover this. Januvia appears to have been covered. Called pharmacy to inquire. Pharmacist states patient was able to  partial supply today 6/30/22 and will  the rest tomorrow 7/1/22. Please refuse current pending Rx.

## 2022-09-01 NOTE — TELEPHONE ENCOUNTER
Please review protocol failed/ No protocol    Requested Prescriptions   Pending Prescriptions Disp Refills    apixaban (ELIQUIS) 5 MG Oral Tab 180 tablet 1     Sig: Take 1 tablet (5 mg total) by mouth 2 (two) times daily.         There is no refill protocol information for this order           Recent Outpatient Visits              3 months ago Uncontrolled type 2 diabetes mellitus with hyperglycemia [facility])    [address] Chai Anderson MD    Office Visit    7 months ago Urinary tract infection without hematuria, site unspecified    [address], [facility], Gayla Orona MD    Telemedicine    9 months ago Type 2 diabetes mellitus without complication, without long-term current use of insulin [facility])    Lydia Holloway MD    Office Visit    1 year ago Type 2 diabetes mellitus without retinopathy ([address]    [facility]    Nurse Only    1 year ago Type 2 diabetes mellitus without retinopathy ([address]    [facility]    Nurse Only

## 2022-09-23 RX ORDER — SITAGLIPTIN 100 MG/1
TABLET, FILM COATED ORAL
Qty: 90 TABLET | Refills: 0 | Status: SHIPPED | OUTPATIENT
Start: 2022-09-23

## 2023-01-30 NOTE — TELEPHONE ENCOUNTER
LOV: 5/14/22    No future appointments. RTC  6 months  [employer]eting[employer] message sent regarding overdue appointment. Pended 30 days worth for now.

## 2023-03-03 ENCOUNTER — TELEPHONE (OUTPATIENT)
Dept: INTERNAL MEDICINE CLINIC | Facility: CLINIC | Age: 58
End: 2023-03-03

## 2023-03-03 DIAGNOSIS — Z12.31 SCREENING MAMMOGRAM, ENCOUNTER FOR: Primary | ICD-10-CM

## 2023-03-07 ENCOUNTER — TELEPHONE (OUTPATIENT)
Dept: INTERNAL MEDICINE CLINIC | Facility: CLINIC | Age: 58
End: 2023-03-07

## 2023-03-09 NOTE — TELEPHONE ENCOUNTER
Prior authorization for eliquis has been approved. The pharmacy has been notified. Approved    Prior authorization approved Case ID: [id number]      Payer:  [address] [place] [address]  [phone]  Sherita Eric  [phone]    [id number]OTCLZF:[id number]; Review Type:Prior Auth; Coverage Start Date:03/08/2023; Coverage End Date:03/07/2024;    Approval Details    Authorized from March 8, 2023 to March 7, 2024      Electronic appeal:  Not supported   View History

## 2023-04-01 ENCOUNTER — HOSPITAL ENCOUNTER (OUTPATIENT)
Dept: MAMMOGRAPHY | Age: 58
Discharge: HOME | End: 2023-04-01
Attending: INTERNAL MEDICINE
Payer: COMMERCIAL

## 2023-04-01 DIAGNOSIS — Z12.31 SCREENING MAMMOGRAM, ENCOUNTER FOR: ICD-10-CM

## 2023-04-01 PROCEDURE — 77063 BREAST TOMOSYNTHESIS BI: CPT | Performed by: INTERNAL MEDICINE

## 2023-04-01 PROCEDURE — 77067 SCR MAMMO BI INCL CAD: CPT | Performed by: INTERNAL MEDICINE

## 2023-04-06 NOTE — TELEPHONE ENCOUNTER
[place] is calling requesting for a 3 month supply (90 tablets) to be sent.  Insurance will only cover if its a 3 month supply instead of a 1 month

## 2023-04-14 ENCOUNTER — HOSPITAL ENCOUNTER (OUTPATIENT)
Dept: MAMMOGRAPHY | Facility: HOSPITAL | Age: 58
Discharge: HOME | End: 2023-04-14
Attending: INTERNAL MEDICINE
Payer: COMMERCIAL

## 2023-04-14 DIAGNOSIS — R92.8 ABNORMAL MAMMOGRAM: ICD-10-CM

## 2023-04-14 PROCEDURE — 77065 DX MAMMO INCL CAD UNI: CPT | Performed by: INTERNAL MEDICINE

## 2023-04-14 PROCEDURE — 77061 BREAST TOMOSYNTHESIS UNI: CPT | Performed by: INTERNAL MEDICINE

## 2023-05-12 RX ORDER — GLIPIZIDE 10 MG/1
TABLET ORAL
Qty: 180 TABLET | Refills: 0 | Status: SHIPPED | OUTPATIENT
Start: 2023-05-12

## 2024-03-07 ENCOUNTER — OFFICE VISIT (OUTPATIENT)
Dept: ENDOCRINOLOGY CLINIC | Facility: CLINIC | Age: 59
End: 2024-03-07
Payer: COMMERCIAL

## 2024-03-07 ENCOUNTER — LAB ENCOUNTER (OUTPATIENT)
Dept: LAB | Facility: HOSPITAL | Age: 59
End: 2024-03-07
Attending: INTERNAL MEDICINE
Payer: COMMERCIAL

## 2024-03-07 VITALS
DIASTOLIC BLOOD PRESSURE: 72 MMHG | HEART RATE: 80 BPM | SYSTOLIC BLOOD PRESSURE: 113 MMHG | BODY MASS INDEX: 29.41 KG/M2 | HEIGHT: 65.98 IN | WEIGHT: 183 LBS

## 2024-03-07 DIAGNOSIS — E11.65 UNCONTROLLED TYPE 2 DIABETES MELLITUS WITH HYPERGLYCEMIA (HCC): Primary | ICD-10-CM

## 2024-03-07 LAB
ALBUMIN SERPL-MCNC: 4.5 G/DL (ref 3.2–4.8)
ALBUMIN/GLOB SERPL: 1.3 {RATIO} (ref 1–2)
ALP LIVER SERPL-CCNC: 77 U/L
ALT SERPL-CCNC: 19 U/L
ANION GAP SERPL CALC-SCNC: 5 MMOL/L (ref 0–18)
AST SERPL-CCNC: 17 U/L (ref ?–34)
BASOPHILS # BLD AUTO: 0.03 X10(3) UL (ref 0–0.2)
BASOPHILS NFR BLD AUTO: 0.6 %
BILIRUB SERPL-MCNC: 0.4 MG/DL (ref 0.2–1.1)
BUN BLD-MCNC: 13 MG/DL (ref 9–23)
BUN/CREAT SERPL: 14.6 (ref 10–20)
CALCIUM BLD-MCNC: 9.8 MG/DL (ref 8.7–10.4)
CARTRIDGE LOT#: ABNORMAL NUMERIC
CHLORIDE SERPL-SCNC: 106 MMOL/L (ref 98–112)
CHOLEST SERPL-MCNC: 198 MG/DL (ref ?–200)
CO2 SERPL-SCNC: 28 MMOL/L (ref 21–32)
CREAT BLD-MCNC: 0.89 MG/DL
CREAT UR-SCNC: 228.4 MG/DL
DEPRECATED RDW RBC AUTO: 40 FL (ref 35.1–46.3)
EGFRCR SERPLBLD CKD-EPI 2021: 74 ML/MIN/1.73M2 (ref 60–?)
EOSINOPHIL # BLD AUTO: 0.06 X10(3) UL (ref 0–0.7)
EOSINOPHIL NFR BLD AUTO: 1.1 %
ERYTHROCYTE [DISTWIDTH] IN BLOOD BY AUTOMATED COUNT: 13.1 % (ref 11–15)
FASTING PATIENT LIPID ANSWER: NO
FASTING STATUS PATIENT QL REPORTED: NO
GLOBULIN PLAS-MCNC: 3.6 G/DL (ref 2.8–4.4)
GLUCOSE BLD-MCNC: 170 MG/DL (ref 70–99)
GLUCOSE BLOOD: 213
HCT VFR BLD AUTO: 43.1 %
HDLC SERPL-MCNC: 61 MG/DL (ref 40–59)
HEMOGLOBIN A1C: 10 % (ref 4.3–5.6)
HGB BLD-MCNC: 13.8 G/DL
IMM GRANULOCYTES # BLD AUTO: 0.01 X10(3) UL (ref 0–1)
IMM GRANULOCYTES NFR BLD: 0.2 %
LDLC SERPL CALC-MCNC: 110 MG/DL (ref ?–100)
LYMPHOCYTES # BLD AUTO: 2.8 X10(3) UL (ref 1–4)
LYMPHOCYTES NFR BLD AUTO: 51.9 %
MCH RBC QN AUTO: 26.7 PG (ref 26–34)
MCHC RBC AUTO-ENTMCNC: 32 G/DL (ref 31–37)
MCV RBC AUTO: 83.5 FL
MICROALBUMIN UR-MCNC: 8.2 MG/DL
MICROALBUMIN/CREAT 24H UR-RTO: 35.9 UG/MG (ref ?–30)
MONOCYTES # BLD AUTO: 0.41 X10(3) UL (ref 0.1–1)
MONOCYTES NFR BLD AUTO: 7.6 %
NEUTROPHILS # BLD AUTO: 2.08 X10 (3) UL (ref 1.5–7.7)
NEUTROPHILS # BLD AUTO: 2.08 X10(3) UL (ref 1.5–7.7)
NEUTROPHILS NFR BLD AUTO: 38.6 %
NONHDLC SERPL-MCNC: 137 MG/DL (ref ?–130)
OSMOLALITY SERPL CALC.SUM OF ELEC: 292 MOSM/KG (ref 275–295)
PLATELET # BLD AUTO: 263 10(3)UL (ref 150–450)
POTASSIUM SERPL-SCNC: 4.3 MMOL/L (ref 3.5–5.1)
PROT SERPL-MCNC: 8.1 G/DL (ref 5.7–8.2)
RBC # BLD AUTO: 5.16 X10(6)UL
SODIUM SERPL-SCNC: 139 MMOL/L (ref 136–145)
T4 FREE SERPL-MCNC: 1.1 NG/DL (ref 0.8–1.7)
TEST STRIP LOT #: NORMAL NUMERIC
TRIGL SERPL-MCNC: 156 MG/DL (ref 30–149)
TSI SER-ACNC: 4.22 MIU/ML (ref 0.55–4.78)
VLDLC SERPL CALC-MCNC: 27 MG/DL (ref 0–30)
WBC # BLD AUTO: 5.4 X10(3) UL (ref 4–11)

## 2024-03-07 PROCEDURE — 84443 ASSAY THYROID STIM HORMONE: CPT | Performed by: INTERNAL MEDICINE

## 2024-03-07 PROCEDURE — 80053 COMPREHEN METABOLIC PANEL: CPT | Performed by: INTERNAL MEDICINE

## 2024-03-07 PROCEDURE — 85025 COMPLETE CBC W/AUTO DIFF WBC: CPT | Performed by: INTERNAL MEDICINE

## 2024-03-07 PROCEDURE — 36415 COLL VENOUS BLD VENIPUNCTURE: CPT | Performed by: INTERNAL MEDICINE

## 2024-03-07 PROCEDURE — 82570 ASSAY OF URINE CREATININE: CPT | Performed by: INTERNAL MEDICINE

## 2024-03-07 PROCEDURE — 80061 LIPID PANEL: CPT | Performed by: INTERNAL MEDICINE

## 2024-03-07 PROCEDURE — 82043 UR ALBUMIN QUANTITATIVE: CPT | Performed by: INTERNAL MEDICINE

## 2024-03-07 PROCEDURE — 84439 ASSAY OF FREE THYROXINE: CPT | Performed by: INTERNAL MEDICINE

## 2024-03-07 RX ORDER — SEMAGLUTIDE 0.68 MG/ML
0.5 INJECTION, SOLUTION SUBCUTANEOUS WEEKLY
Qty: 9 ML | Refills: 1 | Status: SHIPPED | OUTPATIENT
Start: 2024-03-07

## 2024-03-07 NOTE — PROGRESS NOTES
Name: Lori Love  Date: 3/7/2024      Referring Physician: No ref. provider found    HISTORY OF PRESENT ILLNESS   Lori Love is a 59 year old female who presents for diabetes mellitus, diagnosed approximately 2 years ago.      She has been lost to follow up for 2 years with higher BG levels.     Prior HbA, C or glycohemoglobin were 10.0% 3/2021; 7.7% 2022; 10.0% POC Today   Dietary compliance: Fair  Exercise: Yes -->walking   Polyuria/polydipsia: No  Blurred vision: No    Episodes of hypoglycemia: No  Blood Glucose:  Checking 3 times per month  Fastin-220    Medications for DM  Januvia 100mg PO daily   Glipizide 10mg PO BID     Metformin d/c'd due to fatigue     REVIEW OF SYSTEMS  Eyes: Diabetic retinopathy present: No            Most recent visit to eye doctor in last 12 months: No - last visit 2020    CV: Cardiovascular disease present: No - however she does have h/o PE on Eliquis          Hypertension present: No         Hyperlipidemia present: No         Peripheral Vascular Disease present: No    : Nephropathy present: No    Neuro: Neuropathy present: No    Skin: Infection or ulceration: No    Osteoporosis: No    Thyroid disease: No      Medications:     Current Outpatient Medications:     SITagliptin Phosphate (JANUVIA) 100 MG Oral Tab, Take 1 tablet (100 mg total) by mouth daily., Disp: 90 tablet, Rfl: 0    GLIPIZIDE 10 MG Oral Tab, TAKE 1 TABLET BY MOUTH 2 TIMES DAILY BEFORE MEALS., Disp: 180 tablet, Rfl: 0    apixaban (ELIQUIS) 5 MG Oral Tab, Take 1 tablet (5 mg total) by mouth 2 (two) times daily., Disp: 45 tablet, Rfl: 0    linaGLIPtin (TRADJENTA) 5 mg Oral Tab, Take 1 tablet (5 mg total) by mouth daily., Disp: 90 tablet, Rfl: 0    nitrofurantoin monohydrate macro (MACROBID) 100 MG Oral Cap, Take 1 capsule (100 mg total) by mouth 2 (two) times daily., Disp: 20 capsule, Rfl: 0    Glucose Blood (ONETOUCH ULTRA) In Vitro Strip, 1 each by Other route daily., Disp: 100 each, Rfl: 3    Blood  Glucose Monitoring Suppl (ONETOUCH ULTRA 2) w/Device Does not apply Kit, Use to check glucose once daily, Disp: 1 kit, Rfl: 0    Lancets (ONETOUCH DELICA PLUS [id number]) Does not apply Misc, 1 each by Does not apply route daily., Disp: 100 each, Rfl: 3     Allergies:   No Known Allergies    Social History:   Social History     Socioeconomic History    Marital status:    Tobacco Use    Smoking status: Never    Smokeless tobacco: Never   Vaping Use    Vaping Use: Never used   Substance and Sexual Activity    Alcohol use: No    Drug use: Not Currently   Other Topics Concern    Caffeine Concern Yes     Comment: coffee occassionally    Pt has a pacemaker No    Pt has a defibrillator No    Reaction to local anesthetic No       Medical History:   Past Medical History:   Diagnosis Date    Colon adenomas 2019    Deep phlebothrombosis, antepartum (HCC)     Diabetes (HCC)     Female stress incontinence 2012    Glaucoma suspect of both eyes 12/10/2020    History of pregnancy         Pulmonary embolism (HCC)        Surgical history:   Past Surgical History:   Procedure Laterality Date    COLONOSCOPY  2019    repeat 2022    COLONOSCOPY N/A 2019    Procedure: COLONOSCOPY;  Surgeon: Christos Reis MD;  [facility] ENDOSCOPY         PHYSICAL EXAM  /72   Pulse 80   Ht 5' 5.98\" (1.676 m)   Wt 183 lb (83 kg)   LMP 2014 (Exact Date)   BMI 29.55 kg/m²     General Appearance:  alert, well developed, in no acute distress  Eyes:  normal conjunctivae, sclera., normal sclera and normal pupils  Ears/Nose/Mouth/Throat/Neck:  no palpable thyroid nodules or cervical lymphadenopathy  Neck: Trachea midline: Normal  Back: no kyphosis or back tenderness  Lymph Nodes:  No abnormal nodes noted  Musculoskeletal:  normal muscle strength and tone  Skin:  normal moisture and skin texture  Hair & Nails:  normal scalp hair     Hematologic:  no excessive bruising  Neuro:  sensory grossly  intact and motor grossly intact  Psychiatric:  oriented to time, self, and place  Nutritional:  no abnormal weight gain or loss  Bilateral barefoot skin diabetic exam is normal, visualized feet and the appearance is normal.  Bilateral monofilament/sensation of both feet is normal.  Pulsation pedal pulse exam of both lower legs/feet is normal as well.        ASSESSMENT/PLAN:      1. Diabetes Mellitus Type 2, Uncontrolled  -Uncontrolled; 10.0% -->significant increase  -She has been lost to follow up for 2 year    -Discussed importance of glycemic control to prevent complications of diabetes  -Discussed complications of diabetes include retinopathy, neuropathy, nephropathy and cardiovascular disease  -Discussed ABCs of DM  -Discussed importance of SBGM  -Discussed importance of low CHO diet, recommend 45gm per meal or 135gm per day   -Increase Glipizide 10mg PO BID, verbalized understanding of risks and benefits  -Discontinue Januvia  -start Ozempic 0.5mg subcutaneous weekly, verbalized understanding of risks and benefits  -Demonstrated injection during OV   -Side effects with Metformin  -Normotensive  -Normal foot exam performed today   -Normal lipids  -Check yearly labs CMP, lipids, MAB, TSH     RTC 4 months     3/7/2024  Adri Dotson MD

## 2024-03-07 NOTE — PATIENT INSTRUCTIONS
CONTINUE Glipizide    STOP Januvia    START Ozempic 0.25mg subcutaneous weekly for one month then increase to 0.5mg subcutaneous weekly

## 2024-03-19 RX ORDER — BLOOD SUGAR DIAGNOSTIC
STRIP MISCELLANEOUS
Qty: 100 EACH | Refills: 3 | OUTPATIENT
Start: 2024-03-19

## 2024-03-19 RX ORDER — GLUCOSAMINE HCL/CHONDROITIN SU 500-400 MG
CAPSULE ORAL
Qty: 100 EACH | Refills: 3 | Status: CANCELLED | OUTPATIENT
Start: 2024-03-19

## 2024-03-19 RX ORDER — GLUCOSAMINE HCL/CHONDROITIN SU 500-400 MG
CAPSULE ORAL
Qty: 100 EACH | Refills: 3 | OUTPATIENT
Start: 2024-03-19

## 2024-03-19 RX ORDER — LANCETS 33 GAUGE
1 EACH MISCELLANEOUS DAILY
Qty: 100 EACH | Refills: 3 | OUTPATIENT
Start: 2024-03-19

## 2024-03-19 RX ORDER — LANCETS 33 GAUGE
1 EACH MISCELLANEOUS DAILY
Qty: 100 EACH | Refills: 3 | Status: CANCELLED | OUTPATIENT
Start: 2024-03-19

## 2024-03-20 RX ORDER — BLOOD SUGAR DIAGNOSTIC
1 STRIP MISCELLANEOUS DAILY
Qty: 100 EACH | Refills: 1 | Status: SHIPPED | OUTPATIENT
Start: 2024-03-20

## 2024-03-20 NOTE — TELEPHONE ENCOUNTER
Endocrine Refill protocol for Glucose testing supplies       Protocol Criteria: Passed  Appointment with Endocrinology completed in the last 12 months or scheduled in the next 6 months     Verify appointment has been completed or scheduled in the appropriate timeline. If so can send a 90 day supply with 1 refill.        Last completed office visit: 3/7/24  Next scheduled Follow up: 7/25/24

## 2024-06-03 DIAGNOSIS — E11.65 UNCONTROLLED TYPE 2 DIABETES MELLITUS WITH HYPERGLYCEMIA (HCC): ICD-10-CM

## 2024-06-03 RX ORDER — SEMAGLUTIDE 0.68 MG/ML
0.5 INJECTION, SOLUTION SUBCUTANEOUS WEEKLY
Qty: 9 ML | Refills: 1 | Status: SHIPPED | OUTPATIENT
Start: 2024-06-03

## 2024-06-03 NOTE — TELEPHONE ENCOUNTER
Endocrine Refill protocol for oral and injectable diabetic medications    Protocol Criteria:    -Appointment with Endocrinology completed in the last 6 months or scheduled in the next 3 months    -A1c result <8.5% in the past 6 months      Verify the above has been completed or scheduled in the appropriate timeline. If so can send a 90 day supply with 1 refill.     Last completed office visit: 3/7/2024   Next scheduled Follow up: 07/25/24     Last A1c result: Last A1c value was 10% done 3/7/2024.

## 2024-07-24 RX ORDER — GLIPIZIDE 10 MG/1
10 TABLET ORAL
Qty: 180 TABLET | Refills: 1 | Status: SHIPPED | OUTPATIENT
Start: 2024-07-24

## 2024-07-24 NOTE — TELEPHONE ENCOUNTER
Endocrine Refill protocol for oral and injectable diabetic medications    Protocol Criteria:    -Appointment with Endocrinology completed in the last 6 months or scheduled in the next 3 months    -A1c result below 8.5% in the past 6 months      Verify the above has been completed or scheduled in the appropriate timeline. If so can send a 90 day supply with 1 refill.     Last completed office visit: 3/7/2024 Adri Dotson MD   Next scheduled Follow up:   Future Appointments   Date Time Provider Department Center   7/25/2024  5:15 PM Adri Dotson MD [facility]   9/11/2024  4:00 PM Sallie Thomas MD [facility]      Last A1c result: Last A1c value was 10% done 3/7/2024.

## 2024-07-25 ENCOUNTER — OFFICE VISIT (OUTPATIENT)
Dept: ENDOCRINOLOGY CLINIC | Facility: CLINIC | Age: 59
End: 2024-07-25
Payer: COMMERCIAL

## 2024-07-25 VITALS
HEIGHT: 65.98 IN | WEIGHT: 179 LBS | BODY MASS INDEX: 28.77 KG/M2 | HEART RATE: 80 BPM | DIASTOLIC BLOOD PRESSURE: 63 MMHG | SYSTOLIC BLOOD PRESSURE: 98 MMHG

## 2024-07-25 DIAGNOSIS — E11.65 UNCONTROLLED TYPE 2 DIABETES MELLITUS WITH HYPERGLYCEMIA (HCC): ICD-10-CM

## 2024-07-25 DIAGNOSIS — E11.9 CONTROLLED TYPE 2 DIABETES MELLITUS WITHOUT COMPLICATION, WITHOUT LONG-TERM CURRENT USE OF INSULIN (HCC): Primary | ICD-10-CM

## 2024-07-25 LAB
GLUCOSE BLOOD: 90
HEMOGLOBIN A1C: 5.9 % (ref 4.3–5.6)
TEST STRIP LOT #: NORMAL NUMERIC

## 2024-07-25 PROCEDURE — 83036 HEMOGLOBIN GLYCOSYLATED A1C: CPT | Performed by: INTERNAL MEDICINE

## 2024-07-25 PROCEDURE — 82947 ASSAY GLUCOSE BLOOD QUANT: CPT | Performed by: INTERNAL MEDICINE

## 2024-07-25 PROCEDURE — 99213 OFFICE O/P EST LOW 20 MIN: CPT | Performed by: INTERNAL MEDICINE

## 2024-07-25 RX ORDER — SEMAGLUTIDE 0.68 MG/ML
0.5 INJECTION, SOLUTION SUBCUTANEOUS WEEKLY
Qty: 9 ML | Refills: 1 | Status: SHIPPED | OUTPATIENT
Start: 2024-07-25

## 2024-07-25 NOTE — PROGRESS NOTES
Name: Lori Love  Date: 2024      Referring Physician: No ref. provider found    HISTORY OF PRESENT ILLNESS   Lori Love is a 59 year old female who presents for diabetes mellitus, diagnosed approximately 2 years ago.      Prior HbA, C or glycohemoglobin were 10.0% 3/2021; 7.7% 2022; 10.0% 3/2024; 5.9% POC Today   Dietary compliance: Fair  Exercise: Yes -->walking   Polyuria/polydipsia: No  Blurred vision: No    Episodes of hypoglycemia: No  Blood Glucose:  Checking 3 times per month  Fastin-100    Medications for DM  Ozempic 0.5mg subcutaneous weekly   Glipizide 10mg PO BID     Metformin d/c'd due to fatigue     REVIEW OF SYSTEMS  Eyes: Diabetic retinopathy present: No            Most recent visit to eye doctor in last 12 months: No - last visit 2020    CV: Cardiovascular disease present: No - however she does have h/o PE on Eliquis          Hypertension present: No         Hyperlipidemia present: No         Peripheral Vascular Disease present: No    : Nephropathy present: No    Neuro: Neuropathy present: No    Skin: Infection or ulceration: No    Osteoporosis: No    Thyroid disease: No      Medications:     Current Outpatient Medications:     glipiZIDE 10 MG Oral Tab, Take 1 tablet (10 mg total) by mouth 2 (two) times daily before meals., Disp: 180 tablet, Rfl: 1    semaglutide (OZEMPIC, 0.25 OR 0.5 MG/DOSE,) 2 MG/3ML Subcutaneous Solution Pen-injector, Inject 0.5 mg into the skin once a week., Disp: 9 mL, Rfl: 1    Glucose Blood (ONETOUCH ULTRA) In Vitro Strip, 1 each by Other route daily., Disp: 100 each, Rfl: 1    apixaban (ELIQUIS) 5 MG Oral Tab, Take 1 tablet (5 mg total) by mouth 2 (two) times daily., Disp: 45 tablet, Rfl: 0    nitrofurantoin monohydrate macro (MACROBID) 100 MG Oral Cap, Take 1 capsule (100 mg total) by mouth 2 (two) times daily., Disp: 20 capsule, Rfl: 0    Blood Glucose Monitoring Suppl (ONETOUCH ULTRA 2) w/Device Does not apply Kit, Use to check glucose once daily,  Disp: 1 kit, Rfl: 0    Lancets (ONETOUCH DELICA PLUS [id number]) Does not apply Misc, 1 each by Does not apply route daily., Disp: 100 each, Rfl: 3     Allergies:   No Known Allergies    Social History:   Social History     Socioeconomic History    Marital status:    Tobacco Use    Smoking status: Never    Smokeless tobacco: Never   Vaping Use    Vaping status: Never Used   Substance and Sexual Activity    Alcohol use: No    Drug use: Not Currently   Other Topics Concern    Caffeine Concern Yes     Comment: coffee occassionally    Pt has a pacemaker No    Pt has a defibrillator No    Reaction to local anesthetic No       Medical History:   Past Medical History:    Colon adenomas    Deep phlebothrombosis, antepartum (HCC)    Diabetes (HCC)    Female stress incontinence    Glaucoma suspect of both eyes    History of pregnancy        Pulmonary embolism (HCC)       Surgical history:   Past Surgical History:   Procedure Laterality Date    Colonoscopy  2019    repeat 2022    Colonoscopy N/A 2019    Procedure: COLONOSCOPY;  Surgeon: Christos Reis MD;  Location: [facility] ENDOSCOPY         PHYSICAL EXAM  BP 98/63   Pulse 80   Ht 5' 5.98\" (1.676 m)   Wt 179 lb (81.2 kg)   LMP 2014 (Exact Date)   BMI 28.91 kg/m²     General Appearance:  alert, well developed, in no acute distress  Eyes:  normal conjunctivae, sclera., normal sclera and normal pupils  Ears/Nose/Mouth/Throat/Neck:  no palpable thyroid nodules or cervical lymphadenopathy  Back: no kyphosis or back tenderness  Lymph Nodes:  No abnormal nodes noted  Musculoskeletal:  normal muscle strength and tone  Skin:  normal moisture and skin texture  Hair & Nails:  normal scalp hair     Hematologic:  no excessive bruising  Neuro:  sensory grossly intact and motor grossly intact  Psychiatric:  oriented to time, self, and place  Nutritional:  no abnormal weight gain or loss      ASSESSMENT/PLAN:      1. Diabetes Mellitus Type 2,  controlled  -controlled; 5.9% -->improved   -Discussed importance of glycemic control to prevent complications of diabetes  -Discussed complications of diabetes include retinopathy, neuropathy, nephropathy and cardiovascular disease  -Discussed ABCs of DM  -Discussed importance of SBGM  -Discussed importance of low CHO diet, recommend 45gm per meal or 135gm per day   -Continue Glipizide 10mg PO BID, verbalized understanding of risks and benefits  -Continue Ozempic 0.5mg subcutaneous weekly, verbalized understanding of risks and benefits  -Side effects with Metformin  -Normotensive  -Normal foot exam performed 3/2024   -Normal lipids  -Normal kidney function     RTC 6 months     7/25/2024  Adri Dotson MD

## 2024-08-22 NOTE — TELEPHONE ENCOUNTER
Please review. Protocol Failed; No Protocol    Future Appointments   Date Time Provider Department Center   9/11/2024  4:00 PM Sallie Thomas MD [facility]   1/30/2025  5:45 PM Adri Dotson MD [facility]         Requested Prescriptions   Pending Prescriptions Disp Refills    apixaban (ELIQUIS) 5 MG Oral Tab 45 tablet 0     Sig: Take 1 tablet (5 mg total) by mouth 2 (two) times daily.       There is no refill protocol information for this order            Future Appointments         Provider Department Appt Notes    In 2 weeks Sallie Thomas MD [facility] px, last px 3/13/2019    In 5 months Adri Dotson MD [facility] 6 months          Recent Outpatient Visits              4 weeks ago Controlled type 2 diabetes mellitus without complication, without long-term current use of insulin ([facility])    [facility] Adri Dotson MD    Office Visit    5 months ago Uncontrolled type 2 diabetes mellitus with hyperglycemia ([facility])    [facility] Adri Dotson MD    Office Visit    2 years ago Uncontrolled type 2 diabetes mellitus with hyperglycemia ([facility])    [facility] Adri Dotson MD    Office Visit    2 years ago Urinary tract infection without hematuria, site unspecified    [facility] Sallie Thomas MD    Telemedicine    2 years ago Type 2 diabetes mellitus without complication, without long-term current use of insulin ([facility])    [facility] Sallie Thomas MD    Office Visit

## 2024-09-10 RX ORDER — BLOOD SUGAR DIAGNOSTIC
1 STRIP MISCELLANEOUS DAILY
Qty: 100 EACH | Refills: 1 | Status: SHIPPED | OUTPATIENT
Start: 2024-09-10

## 2024-09-10 RX ORDER — BLOOD SUGAR DIAGNOSTIC
STRIP MISCELLANEOUS DAILY
Qty: 100 STRIP | Refills: 0 | OUTPATIENT
Start: 2024-09-10

## 2024-09-11 ENCOUNTER — OFFICE VISIT (OUTPATIENT)
Dept: INTERNAL MEDICINE CLINIC | Facility: CLINIC | Age: 59
End: 2024-09-11
Payer: COMMERCIAL

## 2024-09-11 VITALS
SYSTOLIC BLOOD PRESSURE: 104 MMHG | WEIGHT: 180 LBS | HEART RATE: 85 BPM | BODY MASS INDEX: 28.93 KG/M2 | HEIGHT: 65.98 IN | DIASTOLIC BLOOD PRESSURE: 70 MMHG

## 2024-09-11 DIAGNOSIS — D68.59 HYPERCOAGULABLE STATE (HCC): ICD-10-CM

## 2024-09-11 DIAGNOSIS — Z00.00 PHYSICAL EXAM: Primary | ICD-10-CM

## 2024-09-11 DIAGNOSIS — Z86.711 HISTORY OF PULMONARY EMBOLUS (PE): ICD-10-CM

## 2024-09-11 DIAGNOSIS — E11.9 TYPE 2 DIABETES MELLITUS WITHOUT COMPLICATION, WITHOUT LONG-TERM CURRENT USE OF INSULIN (HCC): ICD-10-CM

## 2024-09-11 DIAGNOSIS — Z12.31 VISIT FOR SCREENING MAMMOGRAM: ICD-10-CM

## 2024-09-11 DIAGNOSIS — Z12.11 SCREENING FOR COLON CANCER: ICD-10-CM

## 2024-09-11 DIAGNOSIS — Z86.718 HISTORY OF DVT (DEEP VEIN THROMBOSIS): ICD-10-CM

## 2024-09-11 DIAGNOSIS — Z12.4 SCREENING FOR CERVICAL CANCER: ICD-10-CM

## 2024-09-11 PROCEDURE — 99396 PREV VISIT EST AGE 40-64: CPT | Performed by: INTERNAL MEDICINE

## 2024-10-01 NOTE — PROGRESS NOTES
HPI:    Patient ID: Lori Love is a 59 year old female.    HPI    Physical exam  Generally healthy    /70 (BP Location: Right arm, Patient Position: Sitting, Cuff Size: adult)   Pulse 85   Ht 5' 5.98\" (1.676 m)   Wt 180 lb (81.6 kg)   LMP 02/27/2014 (Exact Date)   BMI 29.07 kg/m²   Wt Readings from Last 6 Encounters:   09/11/24 180 lb (81.6 kg)   07/25/24 179 lb (81.2 kg)   03/07/24 183 lb (83 kg)   05/14/22 188 lb (85.3 kg)   11/24/21 186 lb (84.4 kg)   06/21/21 187 lb (84.8 kg)     Body mass index is 29.07 kg/m².  HGBA1C:    Lab Results   Component Value Date    A1C 5.9 (A) 07/25/2024    A1C 10.0 (A) 03/07/2024    A1C 7.7 (A) 05/14/2022     (H) 11/18/2020         Review of Systems   Constitutional:  Negative for activity change, chills, fatigue and fever.   HENT:  Negative for ear discharge, nosebleeds, postnasal drip, rhinorrhea, sinus pressure and sore throat.    Eyes:  Negative for pain, discharge and redness.   Respiratory:  Negative for cough, chest tightness, shortness of breath and wheezing.    Cardiovascular:  Negative for chest pain, palpitations and leg swelling.   Gastrointestinal:  Negative for abdominal pain, blood in stool, constipation, diarrhea, nausea and vomiting.   Genitourinary:  Negative for difficulty urinating, dysuria, frequency, hematuria and urgency.   Musculoskeletal:  Negative for back pain, gait problem and joint swelling.   Skin:  Negative for rash.   Neurological:  Negative for syncope, weakness, light-headedness and headaches.   Psychiatric/Behavioral:  Negative for dysphoric mood. The patient is not nervous/anxious.          Current Outpatient Medications   Medication Sig Dispense Refill    apixaban (ELIQUIS) 5 MG Oral Tab Take 1 tablet (5 mg total) by mouth 2 (two) times daily. 60 tablet 2    Glucose Blood (ONETOUCH ULTRA) In Vitro Strip 1 each by Other route daily. 100 each 1    semaglutide (OZEMPIC, 0.25 OR 0.5 MG/DOSE,) 2 MG/3ML Subcutaneous Solution  Pen-injector Inject 0.5 mg into the skin once a week. 9 mL 1    glipiZIDE 10 MG Oral Tab Take 1 tablet (10 mg total) by mouth 2 (two) times daily before meals. 180 tablet 1    Blood Glucose Monitoring Suppl (ONETOUCH ULTRA 2) w/Device Does not apply Kit Use to check glucose once daily 1 kit 0    Lancets (ONETOUCH DELICA PLUS [id number]) Does not apply Misc 1 each by Does not apply route daily. 100 each 3     Allergies:No Known Allergies    HISTORY:  Past Medical History:    Colon adenomas    Deep phlebothrombosis, antepartum (HCC)    Diabetes (HCC)    Female stress incontinence    Glaucoma suspect of both eyes    History of pregnancy        Pulmonary embolism (HCC)      Past Surgical History:   Procedure Laterality Date    Colonoscopy  2019    repeat 2022    Colonoscopy N/A 2019    Procedure: COLONOSCOPY;  Surgeon: Christos Reis MD;  Location: [facility] ENDOSCOPY      Family History   Problem Relation Age of Onset    Clotting Disorder Father         DVT    Diabetes Maternal Grandmother     Glaucoma Maternal Grandmother     Other (Other) Maternal Grandmother     Breast Cancer Maternal Aunt 63    Macular degeneration Neg       Social History:   Social History     Socioeconomic History    Marital status:    Tobacco Use    Smoking status: Never     Passive exposure: Never    Smokeless tobacco: Never   Vaping Use    Vaping status: Never Used   Substance and Sexual Activity    Alcohol use: No    Drug use: Not Currently   Other Topics Concern    Caffeine Concern Yes     Comment: coffee occassionally    Pt has a pacemaker No    Pt has a defibrillator No    Reaction to local anesthetic No        PHYSICAL EXAM:    Physical Exam  Constitutional:       General: She is not in acute distress.     Appearance: She is well-developed. She is not diaphoretic.   HENT:      Head: Normocephalic and atraumatic.      Right Ear: Ear canal normal.      Left Ear: Ear canal normal.      Nose: Nose normal.       Mouth/Throat:      Pharynx: No oropharyngeal exudate or posterior oropharyngeal erythema.   Eyes:      General: No scleral icterus.        Right eye: No discharge.         Left eye: No discharge.      Conjunctiva/sclera: Conjunctivae normal.      Pupils: Pupils are equal, round, and reactive to light.   Cardiovascular:      Rate and Rhythm: Normal rate and regular rhythm.      Heart sounds: Normal heart sounds. No murmur heard.  Pulmonary:      Effort: Pulmonary effort is normal. No respiratory distress.      Breath sounds: Normal breath sounds. No wheezing.   Abdominal:      General: Bowel sounds are normal.      Palpations: Abdomen is soft. There is no mass.      Tenderness: There is no abdominal tenderness. There is no guarding or rebound.      Hernia: No hernia is present.   Musculoskeletal:         General: No tenderness.   Skin:     General: Skin is warm and dry.      Findings: No lesion or rash.   Neurological:      Mental Status: She is alert.      Gait: Gait normal.   Psychiatric:         Behavior: Behavior normal.         Thought Content: Thought content normal.              ASSESSMENT/PLAN:   (Z00.00) Physical exam  (primary encounter diagnosis)  Plan: generally healthy  Jonatan screening mammogram  colonoscpy and pap smear advised    (Z86.718) History of DVT (deep vein thrombosis)  Plan: ONCOLOGY/HEMATOLOGY - INTERNAL, apixaban         (ELIQUIS) 5 MG Oral Tab        Follow up with oncology    (Z86.711) History of pulmonary embolus (PE)  Plan: ONCOLOGY/HEMATOLOGY - INTERNAL, apixaban         (ELIQUIS) 5 MG Oral Tab        Referral given    (D68.59) Hypercoagulable state (HCC)  Plan: ONCOLOGY/HEMATOLOGY - INTERNAL, apixaban         (ELIQUIS) 5 MG Oral Tab        Referral given    (E11.9) Type 2 diabetes mellitus without complication, without long-term current use of insulin (HCC)  Plan: ALT+AST, Basic Metabolic Panel (8), Lipid         Panel, OPHTHALMOLOGY - INTERNAL        HGBA1C:    Lab Results   Component  Value Date    A1C 5.9 (A) 07/25/2024    A1C 10.0 (A) 03/07/2024    A1C 7.7 (A) 05/14/2022     (H) 11/18/2020     Controlled monitor   Followed by valeriano    (R92.8) Abnormal mammogram  Plan: [facility] DENISE 2D+3D SCREENING BILAT         (CPT=77067/00308)        .Breast exam.  Bilateral  No discrete palpable masses or tenderness    No nipple discharge  And no axillary adenopathy.  Self breast exam advised      (Z12.11) Screening for colon cancer  Plan: GASTRO - INTERNAL        Asymptomatic  monitor      (Z12.4) Screening for cervical cancer  Plan: OBG - INTERNAL        Referral given  Patient voiced understanding  and agrees with plan         Orders Placed This Encounter   Procedures    ALT+AST    Basic Metabolic Panel (8)    Lipid Panel       Meds This Visit:  Requested Prescriptions     Signed Prescriptions Disp Refills    apixaban (ELIQUIS) 5 MG Oral Tab 60 tablet 2     Sig: Take 1 tablet (5 mg total) by mouth 2 (two) times daily.       Imaging & Referrals:  ONCOLOGY/HEMATOLOGY - INTERNAL  GASTRO - INTERNAL  OPHTHALMOLOGY - INTERNAL  OBG - INTERNAL  [facility] DENISE 2D+3D SCREENING BILAT (CPT=77067/59918)        ID#[id number]

## 2024-10-03 ENCOUNTER — LAB ENCOUNTER (OUTPATIENT)
Dept: LAB | Age: 59
End: 2024-10-03
Attending: INTERNAL MEDICINE
Payer: COMMERCIAL

## 2024-10-03 DIAGNOSIS — E11.9 TYPE 2 DIABETES MELLITUS WITHOUT COMPLICATION, WITHOUT LONG-TERM CURRENT USE OF INSULIN (HCC): Primary | ICD-10-CM

## 2024-10-03 LAB
ALT SERPL-CCNC: 16 U/L
ANION GAP SERPL CALC-SCNC: 7 MMOL/L (ref 0–18)
AST SERPL-CCNC: 16 U/L (ref ?–34)
BUN BLD-MCNC: 14 MG/DL (ref 9–23)
BUN/CREAT SERPL: 15.4 (ref 10–20)
CALCIUM BLD-MCNC: 9.7 MG/DL (ref 8.7–10.4)
CHLORIDE SERPL-SCNC: 107 MMOL/L (ref 98–112)
CHOLEST SERPL-MCNC: 162 MG/DL (ref ?–200)
CO2 SERPL-SCNC: 28 MMOL/L (ref 21–32)
CREAT BLD-MCNC: 0.91 MG/DL
EGFRCR SERPLBLD CKD-EPI 2021: 72 ML/MIN/1.73M2 (ref 60–?)
FASTING PATIENT LIPID ANSWER: YES
FASTING STATUS PATIENT QL REPORTED: YES
GLUCOSE BLD-MCNC: 105 MG/DL (ref 70–99)
HDLC SERPL-MCNC: 61 MG/DL (ref 40–59)
LDLC SERPL CALC-MCNC: 86 MG/DL (ref ?–100)
NONHDLC SERPL-MCNC: 101 MG/DL (ref ?–130)
OSMOLALITY SERPL CALC.SUM OF ELEC: 295 MOSM/KG (ref 275–295)
POTASSIUM SERPL-SCNC: 4.5 MMOL/L (ref 3.5–5.1)
SODIUM SERPL-SCNC: 142 MMOL/L (ref 136–145)
TRIGL SERPL-MCNC: 81 MG/DL (ref 30–149)
VLDLC SERPL CALC-MCNC: 13 MG/DL (ref 0–30)

## 2024-10-03 PROCEDURE — 80048 BASIC METABOLIC PNL TOTAL CA: CPT | Performed by: INTERNAL MEDICINE

## 2024-10-03 PROCEDURE — 84460 ALANINE AMINO (ALT) (SGPT): CPT

## 2024-10-03 PROCEDURE — 80061 LIPID PANEL: CPT

## 2024-10-03 PROCEDURE — 36415 COLL VENOUS BLD VENIPUNCTURE: CPT | Performed by: INTERNAL MEDICINE

## 2024-10-03 PROCEDURE — 84450 TRANSFERASE (AST) (SGOT): CPT

## 2024-10-05 ENCOUNTER — TELEPHONE (OUTPATIENT)
Dept: ENDOCRINOLOGY CLINIC | Facility: CLINIC | Age: 59
End: 2024-10-05

## 2024-10-05 NOTE — TELEPHONE ENCOUNTER
Prior Authorization for Ozempic 0.25 or 0.5 MG/Dose     Open ([email or web address]/login, enter your credentials.    Enter Key: [id number]  Last name: Ivan  : 1965    Send to plan

## 2024-10-08 NOTE — TELEPHONE ENCOUNTER
Medication PA Requested: Ozempic 0.25 or 0.5 MG/Dose                                                            CoverMyMeds Used:     Key: [id number]     Quantity: 0.5 mg    Day Supply: 90 day supply    Sig: Inject 0.5 mg into the skin once a week.     DX Code:     E11.9, E11.65                                 CPT code (if applicable): N/A    Case Number/Pending Ref#:  N/A

## 2024-10-09 NOTE — TELEPHONE ENCOUNTER
Per Epic, Prior Authorization not required for patient/medication    Mychart message sent to patient

## 2024-10-14 ENCOUNTER — NURSE ONLY (OUTPATIENT)
Facility: CLINIC | Age: 59
End: 2024-10-14

## 2024-10-14 DIAGNOSIS — Z12.11 SCREEN FOR COLON CANCER: Primary | ICD-10-CM

## 2024-10-14 DIAGNOSIS — Z86.0100 HISTORY OF COLONIC POLYPS: ICD-10-CM

## 2024-10-14 NOTE — PROGRESS NOTES
Last Procedure, Date, MD:  2019, Colonoscopy, Dr Reis  Last Diagnosis:  tubular adenoma  Recalled (mth/yrs): 3 years  Sedation Used Previously:  IV  Last Prep Used (if known):  trilyte  Quality Of Prep (if known): good  Anticoagulants: Eliquis (Dr Thomas). Hx of DVT  Diabetic Med's (PO/Injectables): semaglutide, glipizide  Weight loss Med's: no  Iron/Herbal/Multivitamin Supplements (RX/OTC):no  Marijuana/Vaping/CBD: no  Height & Weight: 5'6\"/180  BMI: 29.07  Hx of Cardiac/CVA Issues/(MI/Stroke): no  Devices Pacemaker/Defibrillator/Stents: no  Respiratory Issues/Oxygen Use/ALFONSO/COPD:no  Issues w/ Anesthesia: no    Symptoms (Y/N): no  Symptoms Details: N/A    Special Comments/Notes: LOV with Dr Thomas 2024. Medications allergies and pharmacy verified with the pt    Please advise on orders and prep.     Thank you!         Christos Reis MD   Physician  Gastroenterology     Operative Report  Signed     Date of Service: 2019  9:18 AM  Case Time: Procedures: Surgeons:   2019  8:47 AM COLONOSCOPY    Christos Reis MD               Signed         Colonoscopy Report           Lori Love      1965 Age 54 year old   PCP Sallie Thomas MD Endoscopist Christos Reis MD      Date of procedure: 19     Procedure: Colonoscopy w/ biopsy and snare polypectomy     Pre-operative diagnosis: colorectal cancer screening     Post-operative diagnosis: colon polyps, hemorrhoids     Sedation: Versed 9 mg IV push.  Fentanyl 125 mcg IV push [CONSCIOUS SEDATION provided by Christos Reis MD for 33 minutes. Pre/post-sedation assessment was performed by myself and the RN]     Consent: We discussed the risks/benefits and alternatives to this procedure, as well as the planned sedation. Informed consent was obtained from the patient after the risks of the procedure were discussed, including but not limited to bleeding, perforation, aspiration, infection, or possibility of a missed lesion as  well as the risks of anesthesia including but not limited to cardiopulmonary complications. The patient signed informed consent and elected to proceed with Colonoscopy with intervention [i.e. Biopsy, control of bleeding, dilatation, polypectomy, endoscopic mucosal resection, etc.] as indicated.     Colonoscopy procedure: Once an adequate level of sedation was obtained a digital rectal exam was completed revealing normal tone and no masses palpated. Then the lubricated tip of the [id number] diagnostic video colonoscope was carefully inserted and advanced without difficulty to the cecum using the air insufflation technique (only Co2 was used for insufflation). The cecum was identified by localizing the trifold, the appendix and the ileocecal valve. Withdrawal was begun with thorough washing and careful examination of the colonic walls and folds. The ascending colon was viewed twice in the forward view. Photodocumentation was obtained. The bowel prep was good. Views of the colon were good with washing. Withdrawal time was 20 minutes.     Air was then withdrawn and the endoscope was removed. The patient tolerated the procedure well. There were no immediate postoperative complications. The patient’s vital signs were monitored throughout the procedure and remained stable.     Estimated blood loss: insignificant     Specimens collected:  Colon polyps     Complications: none      Colonoscopy findings:  Terminal ileum: normal mucosa     Cecum: there was a 3 mm polyp removed by cold biopsy forceps. Otherwise, normal mucosa and vascular pattern     Ascending colon: there were two polyps measuring 4-5 mm removed by cold snare polypectomy. Otherwise, normal mucosa and vascular pattern     Transverse colon: there was a 3 mm polyp removed by cold biopsy forceps. Otherwise, normal mucosa and vascular pattern     Descending colon: normal mucosa and vascular pattern     Sigmoid colon: normal mucosa and vascular pattern      Rectum: retroflexed view showed small non-bleeding hemorrhoids. Otherwise, normal mucosa and vascular pattern.     Impression:  1. 4 small (5 mm or less in size) colon polyps removed  2. Small non-bleeding hemorrhoids  3. Otherwise, unremarkable ileocolonoscopy     Recommend:  1. Await pathology.   2. Repeat colonoscopy (with MAC) in 3-5 years pending pathology results. If new signs or symptoms develop, procedure may need to be repeated sooner.   3. Continue your current medications  4. Follow up with your primary care physician on a routine basis     >>>If biopsies were performed and you have not received your pathology results either by phone or letter within 2 weeks, please call our office at [phone].        Christos Reis MD  [facility] Gastroenterology  7/1/2019      ======================================================  Final Diagnosis:      A. Cecal polyp:   Tubular adenoma.     B. Ascending colon polyp:   Tubular adenoma.     C. Transverse colon polyp:   Tubular adenoma.                       Electronically signed by Christos Reis MD at 7/1/2019  9:21 AM

## 2024-10-15 NOTE — PROGRESS NOTES
Ok to schedule colonoscopy with MAC with split trilyte for colorectal cancer screening and history of adenomatous colon polyps at [facility] or [facility] or \[facility]"" on a Friday and also endoscopist directed moderate sedation eligible    Hold semaglutide 1 week prior  Hold glipizide day before and day of     See if ok with prescribing physician to hold eliquis 2 days prior    Thanks    Christos Reis MD  [facility] - Gastroenterology

## 2024-10-16 NOTE — PROGRESS NOTES
Scheduled for:  Colonoscopy [id number]  Provider Name:    Date:  Friday, 01/03/2025  Location:  [facility]  Sedation:  MAC  Time:  1230pm (pt is aware Endo will call with arrival time     Prep:  Golytely   Meds/Allergies Reconciled?:  Yes    Diagnosis with codes:  colorectal cancer screening Z12.11 and history of adenomatous colon polyps Z86.010  Was patient informed to call insurance with codes (Y/N):  Yes     Referral sent?:  Referral was sent at the time of electronic surgical scheduling.    [facility] or [facility] notified?:  I sent an electronic request to Endo Scheduling and received a confirmation today.       Medication Orders:  Hold semaglutide 1 week prior  Hold glipizide day before and day of      See if ok with prescribing physician to hold eliquis 2 days prior  Misc Orders:  None     Further instructions given by staff:  I discussed the prep instructions with the patient which she verbally understood and is aware that I will send the instructions today.via [employer]

## 2024-10-16 NOTE — PROGRESS NOTES
[facility]      Please send prep     [id number] - [place], [place] - [address] [phone], [phone] [address] [place] [address]   Phone: [phone] Fax: [phone]

## 2024-10-24 ENCOUNTER — HOSPITAL ENCOUNTER (OUTPATIENT)
Dept: MAMMOGRAPHY | Age: 59
Discharge: HOME | End: 2024-10-24
Attending: INTERNAL MEDICINE
Payer: COMMERCIAL

## 2024-10-24 DIAGNOSIS — Z12.31 VISIT FOR SCREENING MAMMOGRAM: ICD-10-CM

## 2024-10-24 DIAGNOSIS — E11.65 UNCONTROLLED TYPE 2 DIABETES MELLITUS WITH HYPERGLYCEMIA (HCC): ICD-10-CM

## 2024-10-24 PROCEDURE — 77067 SCR MAMMO BI INCL CAD: CPT | Performed by: INTERNAL MEDICINE

## 2024-10-24 PROCEDURE — 77063 BREAST TOMOSYNTHESIS BI: CPT | Performed by: INTERNAL MEDICINE

## 2024-10-24 RX ORDER — SEMAGLUTIDE 0.68 MG/ML
0.5 INJECTION, SOLUTION SUBCUTANEOUS WEEKLY
Qty: 9 ML | Refills: 1 | Status: SHIPPED | OUTPATIENT
Start: 2024-10-24

## 2024-10-24 NOTE — TELEPHONE ENCOUNTER
Endocrine Refill protocol for oral and injectable diabetic medications    Protocol Criteria:  PASSED  Reason: N/A    If all below requirements are met, send a 90-day supply with 1 refill per provider protocol.    Verify appointment with Endocrinology completed in the last 6 months or scheduled in the next 3 months.  Verify A1C has been completed within the last 6 months and is below 8.5%     Last completed office visit: 7/25/2024 Adri Dotson MD   Next scheduled Follow up:   Future Appointments   Date Time Provider Department Center   1/3/2025 12:30 PM YESSENIA, PROCEDURE [id number] None   1/30/2025  5:45 PM Ardi Dotson MD [facility]      Last A1c result: Last A1c value was 5.9% done 7/25/2024.

## 2024-11-20 ENCOUNTER — PATIENT MESSAGE (OUTPATIENT)
Dept: INTERNAL MEDICINE CLINIC | Facility: CLINIC | Age: 59
End: 2024-11-20

## 2024-11-21 ENCOUNTER — TELEPHONE (OUTPATIENT)
Facility: CLINIC | Age: 59
End: 2024-11-21

## 2024-11-21 NOTE — TELEPHONE ENCOUNTER
Dr. Thomas    Patient is scheduled for colonoscopy on 1/3/2025.    Can patient hold Eliquis for 2 days prior to procedure?    Thank you

## 2024-11-21 NOTE — TELEPHONE ENCOUNTER
Patient dropped off forms at [facility] location. Forms placed in providers. Please call patient when forms are ready for pick-up !

## 2024-11-25 NOTE — TELEPHONE ENCOUNTER
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 (CMS/HCC);  Acute embolism and thrombosis of left popliteal vein (CMS/HCC);  Acute embolism and thrombosis of left tibial vein (CMS/HCC);  Acute embolism and thrombosis of unspecified deep veins of left distal lower extremity (CMS/HCC);  Type 2 diabetes mellitus with hyperglycemia (CMS/HCC);  Patient's other noncompliance with medication regimen;  Hypoxemia;  Tachycardia, unspecified;  Chronic passive congestion of liver;  Essential (primary) hypertension;  Other specified abnormal findings of blood chemistry;  Thrombocytopenia, unspecified (CMS/HCC);  Personal history of pulmonary embolism;  Long term (current) use of oral hypoglycemic drugs  Discharge Disposition: Home or Self Care

## 2024-12-06 ENCOUNTER — PATIENT MESSAGE (OUTPATIENT)
Dept: INTERNAL MEDICINE CLINIC | Facility: CLINIC | Age: 59
End: 2024-12-06

## 2024-12-11 ENCOUNTER — TELEPHONE (OUTPATIENT)
Dept: INTERNAL MEDICINE CLINIC | Facility: CLINIC | Age: 59
End: 2024-12-11

## 2024-12-11 NOTE — TELEPHONE ENCOUNTER
Spoke to [facility], indicated that they received the eliquis on 9/11/2024 but it was never picked up by patient. Looks like they filled it in October 2024 and November 2024 but it was put back since patient never picked it up. Last time they have on record patient picked up eliquis at their pharmacy was 9/2023 for 45 tablets. Tried to run it through now and it is coming up as a prior authorization.     Spoke to patient and indicated that the last time she was on the eliquis was in July 2024. Tried to get the prescription at [facility] that Dr Thomas prescribed on 9/11/2024 but it was never ready or there was a problem with it. Patient stated that she had a pulmonary embolism back in [phone]. Wanted to know if she needs to be on the eliquis still? Did you want to go ahead with the prior authorization?          Lori Durbin Rn Triage (supporting Sallie Thomas MD)4 hours ago (11:33 AM)       Shantal Thomas,  Yes this was done in [place].  One of your nurses called me on yesterday  and stated the form has been filled out and the forms are  in the [facility] location but completed. She will mail them to me. I’m all set with this task.   On another note  I still have not received my Eliquois  tablets since you approved on 9/11/24. Maybe I no longer need to take?     Pharmacy    [id number] - [place], [place] - [address]  [phone], [phone]      Disp Refills Start End    apixaban (ELIQUIS) 5 MG Oral Tab 60 tablet 2 9/11/2024 --    Sig - Route: Take 1 tablet (5 mg total) by mouth 2 (two) times daily. - Oral    Sent to pharmacy as: Apixaban 5 MG Oral Tablet (Eliquis)    E-Prescribing Status: Receipt confirmed by pharmacy (9/11/2024  4:48 PM CDT)

## 2024-12-12 NOTE — TELEPHONE ENCOUNTER
Approved    Prior authorization approved  Payer: [employer] Case ID: [id number]    [phone]    [phone]  Note from payer: CaseId:[id number];Status:Approved;Review Type:Prior Auth;Coverage Start Date:12/12/2024;Coverage End Date:12/12/2025;  Approval Details    Authorized from December 12, 2024 to December 12, 2025  Electronic appeal: Not supported  View History

## 2024-12-12 NOTE — TELEPHONE ENCOUNTER
Dr. Reis,    Patient was given ok to hold Eliquis for 3 days.     You requested patient to hold for 2 days.    Please advise if should be held for 2 or 3 days.

## 2024-12-13 NOTE — TELEPHONE ENCOUNTER
Hold 2 days prior    Thanks    MD Isaak [facility] - Gastroenterology  12/13/2024  3:10 PM

## 2024-12-27 DIAGNOSIS — E11.65 UNCONTROLLED TYPE 2 DIABETES MELLITUS WITH HYPERGLYCEMIA (HCC): ICD-10-CM

## 2024-12-30 RX ORDER — SEMAGLUTIDE 0.68 MG/ML
0.5 INJECTION, SOLUTION SUBCUTANEOUS WEEKLY
Qty: 9 ML | Refills: 1 | Status: SHIPPED | OUTPATIENT
Start: 2024-12-30

## 2024-12-30 NOTE — TELEPHONE ENCOUNTER
Endocrine Refill protocol for oral and injectable diabetic medications    Protocol Criteria:  PASSED  Reason: N/A    If all below requirements are met, send a 90-day supply with 1 refill per provider protocol.    Verify appointment with Endocrinology completed in the last 6 months or scheduled in the next 3 months.  Verify A1C has been completed within the last 6 months and is below 8.5%     Last completed office visit: 7/25/2024 Adri Dotson MD   Next scheduled Follow up:   Future Appointments   Date Time Provider Department Center   1/3/2025 12:30 PM YESSENIA, PROCEDURE [id number] None   1/30/2025  5:45 PM Adri Dotson MD [facility]   3/1/2025  8:20 AM Isha Henning MD [facility]      Last A1c result: Last A1c value was 5.9% done 7/25/2024.

## 2025-01-03 ENCOUNTER — HOSPITAL ENCOUNTER (OUTPATIENT)
Facility: HOSPITAL | Age: 60
Setting detail: HOSPITAL OUTPATIENT SURGERY
Discharge: HOME | End: 2025-01-03
Attending: INTERNAL MEDICINE | Admitting: INTERNAL MEDICINE
Payer: COMMERCIAL

## 2025-01-03 ENCOUNTER — ANESTHESIA (OUTPATIENT)
Dept: ENDOSCOPY | Facility: HOSPITAL | Age: 60
End: 2025-01-03
Payer: COMMERCIAL

## 2025-01-03 ENCOUNTER — ANESTHESIA EVENT (OUTPATIENT)
Dept: ENDOSCOPY | Facility: HOSPITAL | Age: 60
End: 2025-01-03
Payer: COMMERCIAL

## 2025-01-03 ENCOUNTER — TELEPHONE (OUTPATIENT)
Facility: CLINIC | Age: 60
End: 2025-01-03

## 2025-01-03 VITALS
RESPIRATION RATE: 23 BRPM | DIASTOLIC BLOOD PRESSURE: 75 MMHG | HEIGHT: 66 IN | WEIGHT: 178 LBS | SYSTOLIC BLOOD PRESSURE: 103 MMHG | OXYGEN SATURATION: 100 % | BODY MASS INDEX: 28.61 KG/M2 | HEART RATE: 85 BPM

## 2025-01-03 DIAGNOSIS — Z86.0100 HISTORY OF COLONIC POLYPS: ICD-10-CM

## 2025-01-03 DIAGNOSIS — Z12.11 SCREEN FOR COLON CANCER: ICD-10-CM

## 2025-01-03 PROBLEM — Z86.0101 PERSONAL HISTORY OF ADENOMATOUS AND SERRATED COLON POLYPS: Status: ACTIVE | Noted: 2025-01-03

## 2025-01-03 LAB — GLUCOSE BLDC GLUCOMTR-MCNC: 115 MG/DL (ref 70–99)

## 2025-01-03 PROCEDURE — 45380 COLONOSCOPY AND BIOPSY: CPT | Performed by: INTERNAL MEDICINE

## 2025-01-03 PROCEDURE — 0DBM8ZX EXCISION OF DESCENDING COLON, VIA NATURAL OR ARTIFICIAL OPENING ENDOSCOPIC, DIAGNOSTIC: ICD-10-PCS | Performed by: INTERNAL MEDICINE

## 2025-01-03 PROCEDURE — 45385 COLONOSCOPY W/LESION REMOVAL: CPT | Performed by: INTERNAL MEDICINE

## 2025-01-03 PROCEDURE — 0DBL8ZX EXCISION OF TRANSVERSE COLON, VIA NATURAL OR ARTIFICIAL OPENING ENDOSCOPIC, DIAGNOSTIC: ICD-10-PCS | Performed by: INTERNAL MEDICINE

## 2025-01-03 RX ORDER — SODIUM CHLORIDE, SODIUM LACTATE, POTASSIUM CHLORIDE, CALCIUM CHLORIDE 600; 310; 30; 20 MG/100ML; MG/100ML; MG/100ML; MG/100ML
INJECTION, SOLUTION INTRAVENOUS CONTINUOUS
Status: DISCONTINUED | OUTPATIENT
Start: 2025-01-03 | End: 2025-01-03

## 2025-01-03 RX ORDER — LIDOCAINE HYDROCHLORIDE 10 MG/ML
INJECTION, SOLUTION EPIDURAL; INFILTRATION; INTRACAUDAL; PERINEURAL AS NEEDED
Status: DISCONTINUED | OUTPATIENT
Start: 2025-01-03 | End: 2025-01-03 | Stop reason: SURG

## 2025-01-03 RX ORDER — NALOXONE HYDROCHLORIDE 0.4 MG/ML
0.08 INJECTION, SOLUTION INTRAMUSCULAR; INTRAVENOUS; SUBCUTANEOUS ONCE AS NEEDED
Status: DISCONTINUED | OUTPATIENT
Start: 2025-01-03 | End: 2025-01-03

## 2025-01-03 RX ADMIN — SODIUM CHLORIDE, SODIUM LACTATE, POTASSIUM CHLORIDE, CALCIUM CHLORIDE: 600; 310; 30; 20 INJECTION, SOLUTION INTRAVENOUS at 11:10:00

## 2025-01-03 RX ADMIN — LIDOCAINE HYDROCHLORIDE 40 MG: 10 INJECTION, SOLUTION EPIDURAL; INFILTRATION; INTRACAUDAL; PERINEURAL at 11:17:00

## 2025-01-03 NOTE — TELEPHONE ENCOUNTER
Health maintenance updated. Last colonoscopy done 01/03/25. 7 year recall placed into [employer], next due on 01/2032 per Dr. Reis.

## 2025-01-03 NOTE — DISCHARGE INSTRUCTIONS
Home Care Instructions for Colonoscopy with Sedation    Diet:  - Resume your regular diet as tolerated unless otherwise instructed.  - Start with light meals to minimize bloating.  - Do not drink alcohol today.    Medication:  - If you have questions about resuming your normal medications, please contact your Primary Care Physician.    Activities:  - Take it easy today. Do not return to work today.  - Do not drive today.  - Do not operate any machinery today (including kitchen equipment).    Colonoscopy:  - You may notice some rectal \"spotting\" (a little blood on the toilet tissue) for a day or two after the exam. This is normal.  - If you experience any rectal bleeding (not spotting), persistent tenderness or sharp severe abdominal pains, oral temperature over 100 degrees Fahrenheit, light-headedness or dizziness, or any other problems, contact your doctor.      **If unable to reach your doctor, please go to the [facility] Emergency Room**    - Your referring physician will receive a full report of your examination.  - If you do not hear from your doctor's office within two weeks of your biopsy, please call them for your results.    You may be able to see your laboratory results in [employer] between 4 and 7 business days.  In some cases, your physician may not have viewed the results before they are released to [employer].  If you have questions regarding your results contact the physician who ordered the test/exam by phone or via [employer] by choosing \"Ask a Medical Question.\"

## 2025-01-03 NOTE — H&P
History & Physical Examination    Patient Name: Lori Love  MRN: [id number]  CSN: [id number]  YOB: 1965    Diagnosis: colorectal cancer screening, history of adenomatous colon polyps    Last underwent colonoscopy in 2019 with findings of 4 diminutive adenomatous colon polyps removed    Prescriptions Prior to Admission[1]  Current Facility-Administered Medications   Medication Dose Route Frequency    lactated ringers infusion   Intravenous Continuous       Allergies: Allergies[2]    Past Medical History:    Colon adenomas    Deep phlebothrombosis, antepartum (HCC)    Diabetes (HCC)    Female stress incontinence    Glaucoma suspect of both eyes    History of blood transfusion     childbirth    History of pregnancy        Pulmonary embolism (HCC)     Past Surgical History:   Procedure Laterality Date    Colonoscopy  2019    repeat 2022    Colonoscopy N/A 2019    Procedure: COLONOSCOPY;  Surgeon: Christos Reis MD;  Location: [facility] ENDOSCOPY     Family History   Problem Relation Age of Onset    Clotting Disorder Father         DVT    Diabetes Maternal Grandmother     Glaucoma Maternal Grandmother     Other (Other) Maternal Grandmother     Breast Cancer Maternal Aunt 63    Macular degeneration Neg      Social History     Tobacco Use    Smoking status: Never     Passive exposure: Never    Smokeless tobacco: Never   Substance Use Topics    Alcohol use: No       SYSTEM Check if Physical Exam is Normal If not normal, please explain:   HEENT [ X]    NECK  [ X]    HEART [ X]    LUNGS [ X]    ABDOMEN [ X]    EXTREMITIES [ X]      General:awake, cooperative, no acute distress  HEENT: EOMI, no scleral icterus, MMM; oral pharnyx is without exudates or lesions  Neck: no lymphadenopathy; thyroid is not enlarged and without nodules  CV: RRR  Resp: non-labored breathing  Abd: soft, non-tender, non-distended  Ext: no lower extremity swelling  Neuro: Alert, Oriented X 3  Skin: no rashes,  bruises  Psych: normal affect  I have discussed the risks and benefits and alternatives of the procedure with the patient/family.  She understands and agreed to proceed with plan of care.   Christos Reis MD  [facility] - Gastroenterology  1/3/2025  11:08 AM                   [1]   Medications Prior to Admission   Medication Sig Dispense Refill Last Dose/Taking    glipiZIDE 10 MG Oral Tab Take 1 tablet (10 mg total) by mouth 2 (two) times daily before meals. 180 tablet 1 1/2/2025    semaglutide (OZEMPIC, 0.25 OR 0.5 MG/DOSE,) 2 MG/3ML Subcutaneous Solution Pen-injector Inject 0.5 mg into the skin once a week. 9 mL 1 12/27/2024    polyethylene glycol, PEG 3350-KCl-NaBcb-NaCl-NaSulf, 236 g Oral Recon Soln Take 4,000 mL by mouth As Directed. May substitute prescription with Golytely/Nulytely/Gavilyte and or generic equivalent, if needed. Take bowel preparation as provided by Gastroenterology office, or visit our website at [email or web address] 4000 mL 0     apixaban (ELIQUIS) 5 MG Oral Tab Take 1 tablet (5 mg total) by mouth 2 (two) times daily. 60 tablet 2     Glucose Blood (ONETOUCH ULTRA) In Vitro Strip 1 each by Other route daily. 100 each 1     Blood Glucose Monitoring Suppl (ONETOUCH ULTRA 2) w/Device Does not apply Kit Use to check glucose once daily 1 kit 0 1/2/2025    Lancets (ONETOUCH DELICA PLUS [id number]) Does not apply Misc 1 each by Does not apply route daily. 100 each 3 1/2/2025   [2] No Known Allergies

## 2025-01-03 NOTE — ANESTHESIA PREPROCEDURE EVALUATION
Anesthesia PreOp Note    HPI:     Lori Love is a 59 year old female who presents for preoperative consultation requested by: Christos Reis MD    Date of Surgery: 1/3/2025    Procedure(s):  COLONOSCOPY  Indication: Screen for colon cancer/ History of colonic polyps    Relevant Problems   No relevant active problems       NPO:  Last Liquid Consumption Date: 25  Last Liquid Consumption Time: 0800  Last Solid Consumption Date: 25  Last Solid Consumption Time: 0000  Last Liquid Consumption Date: 25          History Review:  Patient Active Problem List    Diagnosis Date Noted    Cystocele, midline 2021    Glaucoma suspect of both eyes 12/10/2020    Age-related nuclear cataract of both eyes 12/10/2020    Personal history of pulmonary embolism 2013    Primary hypercoagulable state (HCC) 2013       Past Medical History:    Colon adenomas    Deep phlebothrombosis, antepartum (HCC)    Diabetes (HCC)    Female stress incontinence    Glaucoma suspect of both eyes    History of blood transfusion     childbirth    History of pregnancy        Pulmonary embolism (HCC)       Past Surgical History:   Procedure Laterality Date    Colonoscopy  2019    repeat 2022    Colonoscopy N/A 2019    Procedure: COLONOSCOPY;  Surgeon: Christos Reis MD;  [facility] ENDOSCOPY       Prescriptions Prior to Admission[1]  Current Medications and Prescriptions Ordered in Epic[2]    Allergies[3]    Family History   Problem Relation Age of Onset    Clotting Disorder Father         DVT    Diabetes Maternal Grandmother     Glaucoma Maternal Grandmother     Other (Other) Maternal Grandmother     Breast Cancer Maternal Aunt 63    Macular degeneration Neg      Social History     Socioeconomic History    Marital status:    Tobacco Use    Smoking status: Never     Passive exposure: Never    Smokeless tobacco: Never   Vaping Use    Vaping status: Never Used   Substance and Sexual  Activity    Alcohol use: No    Drug use: Not Currently   Other Topics Concern    Caffeine Concern Yes     Comment: coffee occassionally    Pt has a pacemaker No    Pt has a defibrillator No    Reaction to local anesthetic No       Available pre-op labs reviewed.     Lab Results   Component Value Date    PGLU 115 (H) 01/03/2025          Vital Signs:  Body mass index is 28.73 kg/m².   height is 1.676 m (5' 6\") and weight is 80.7 kg (178 lb). Her blood pressure is 115/72 and her pulse is 101. Her respiration is 15 and oxygen saturation is 93%.   Vitals:    12/27/24 1301 01/03/25 1040   BP:  115/72   Pulse:  101   Resp:  15   SpO2:  93%   Weight: 80.7 kg (178 lb)    Height: 1.676 m (5' 6\")         Anesthesia Evaluation     Patient summary reviewed    Airway   Mallampati: II  TM distance: >3 FB  Neck ROM: full  Dental      Pulmonary - negative ROS and normal exam    breath sounds clear to auscultation  Cardiovascular - negative ROS  Exercise tolerance: good    NYHA Classification: II  Rhythm: regular  Rate: normal    Neuro/Psych      GI/Hepatic/Renal - negative ROS     Endo/Other    (+) diabetes mellitus type 2    Comments: Took ozempic last week  Abdominal  - normal exam    Abdomen: soft.                 Anesthesia Plan:   ASA:  2  Plan:   MAC  Informed Consent Plan and Risks Discussed With:  Patient  Discussed plan with:  CRNA      I have informed Lori Love and/or legal guardian or family member of the nature of the anesthetic plan, benefits, risks including possible dental damage if relevant, major complications, and any alternative forms of anesthetic management.   All of the patient's questions were answered to the best of my ability. The patient desires the anesthetic management as planned.  Christos Patel CRNA  1/3/2025 11:06 AM  Present on Admission:  **None**           [1]   Medications Prior to Admission   Medication Sig Dispense Refill Last Dose/Taking    glipiZIDE 10 MG Oral Tab Take 1 tablet (10 mg  total) by mouth 2 (two) times daily before meals. 180 tablet 1 1/2/2025    semaglutide (OZEMPIC, 0.25 OR 0.5 MG/DOSE,) 2 MG/3ML Subcutaneous Solution Pen-injector Inject 0.5 mg into the skin once a week. 9 mL 1 12/27/2024    polyethylene glycol, PEG 3350-KCl-NaBcb-NaCl-NaSulf, 236 g Oral Recon Soln Take 4,000 mL by mouth As Directed. May substitute prescription with Golytely/Nulytely/Gavilyte and or generic equivalent, if needed. Take bowel preparation as provided by Gastroenterology office, or visit our website at [email or web address] 4000 mL 0     apixaban (ELIQUIS) 5 MG Oral Tab Take 1 tablet (5 mg total) by mouth 2 (two) times daily. 60 tablet 2     Glucose Blood (ONETOUCH ULTRA) In Vitro Strip 1 each by Other route daily. 100 each 1     Blood Glucose Monitoring Suppl (ONETOUCH ULTRA 2) w/Device Does not apply Kit Use to check glucose once daily 1 kit 0 1/2/2025    Lancets (ONETOUCH DELICA PLUS [id number]) Does not apply Misc 1 each by Does not apply route daily. 100 each 3 1/2/2025   [2]   Current Facility-Administered Medications Ordered in Epic   Medication Dose Route Frequency Provider Last Rate Last Admin    lactated ringers infusion   Intravenous Continuous Christos Reis MD         No current [facility]-ordered outpatient medications on file.   [3] No Known Allergies

## 2025-01-03 NOTE — ANESTHESIA POSTPROCEDURE EVALUATION
Patient: Lori Love    Procedure Summary       Date: 01/03/25 Room / Location: [facility] ENDOSCOPY 01 / [facility] ENDOSCOPY    Anesthesia Start: 1113 Anesthesia Stop:     Procedure: COLONOSCOPY Diagnosis:       Screen for colon cancer      History of colonic polyps      (colon polyps; hemorrhoids)    Surgeons: Christos Reis MD Anesthesiologist: Christos Patel CRNA    Anesthesia Type: MAC ASA Status: 2            Anesthesia Type: MAC    Vitals Value Taken Time   BP 99/70 01/03/25 1143   Temp 98 01/03/25 1146   Pulse 102 01/03/25 1145   Resp 20 01/03/25 1145   SpO2 100 % 01/03/25 1145   Vitals shown include unfiled device data.    [facility] AN Post Evaluation:   Patient Evaluated in PACU  Patient Participation: complete - patient participated  Level of Consciousness: awake  Pain Score: 0  Pain Management: adequate  Airway Patency:patent  Yes    Nausea/Vomiting: none  Cardiovascular Status: acceptable  Respiratory Status: acceptable  Postoperative Hydration acceptable      Christos Patel CRNA  1/3/2025 11:46 AM

## 2025-01-03 NOTE — TELEPHONE ENCOUNTER
----- Message from Christos Reis sent at 1/3/2025  4:09 PM CST -----  GI staff: please place recall for colonoscopy in 7 years     Thanks    [facility] Reis MD  [facility]  1/3/2025  4:07 PM

## 2025-01-30 ENCOUNTER — LAB ENCOUNTER (OUTPATIENT)
Dept: LAB | Facility: HOSPITAL | Age: 60
End: 2025-01-30
Attending: INTERNAL MEDICINE
Payer: COMMERCIAL

## 2025-01-30 ENCOUNTER — OFFICE VISIT (OUTPATIENT)
Dept: ENDOCRINOLOGY CLINIC | Facility: CLINIC | Age: 60
End: 2025-01-30
Payer: COMMERCIAL

## 2025-01-30 VITALS
DIASTOLIC BLOOD PRESSURE: 73 MMHG | HEIGHT: 66 IN | SYSTOLIC BLOOD PRESSURE: 108 MMHG | WEIGHT: 185.19 LBS | HEART RATE: 88 BPM | BODY MASS INDEX: 29.76 KG/M2

## 2025-01-30 DIAGNOSIS — E11.9 CONTROLLED TYPE 2 DIABETES MELLITUS WITHOUT COMPLICATION, WITHOUT LONG-TERM CURRENT USE OF INSULIN (HCC): Primary | ICD-10-CM

## 2025-01-30 LAB
ALBUMIN SERPL-MCNC: 4.5 G/DL (ref 3.2–4.8)
ALBUMIN/GLOB SERPL: 1.6 {RATIO} (ref 1–2)
ALP LIVER SERPL-CCNC: 71 U/L
ALT SERPL-CCNC: 21 U/L
ANION GAP SERPL CALC-SCNC: 5 MMOL/L (ref 0–18)
AST SERPL-CCNC: 12 U/L (ref ?–34)
BASOPHILS # BLD AUTO: 0.03 X10(3) UL (ref 0–0.2)
BASOPHILS NFR BLD AUTO: 0.4 %
BILIRUB SERPL-MCNC: 0.4 MG/DL (ref 0.2–1.1)
BUN BLD-MCNC: 19 MG/DL (ref 9–23)
BUN/CREAT SERPL: 20.2 (ref 10–20)
CALCIUM BLD-MCNC: 10.3 MG/DL (ref 8.7–10.4)
CHLORIDE SERPL-SCNC: 104 MMOL/L (ref 98–112)
CHOLEST SERPL-MCNC: 175 MG/DL (ref ?–200)
CO2 SERPL-SCNC: 30 MMOL/L (ref 21–32)
CREAT BLD-MCNC: 0.94 MG/DL
CREAT UR-SCNC: 102 MG/DL
DEPRECATED RDW RBC AUTO: 40.5 FL (ref 35.1–46.3)
EGFRCR SERPLBLD CKD-EPI 2021: 69 ML/MIN/1.73M2 (ref 60–?)
EOSINOPHIL # BLD AUTO: 0.07 X10(3) UL (ref 0–0.7)
EOSINOPHIL NFR BLD AUTO: 1 %
ERYTHROCYTE [DISTWIDTH] IN BLOOD BY AUTOMATED COUNT: 13.4 % (ref 11–15)
FASTING PATIENT LIPID ANSWER: NO
FASTING STATUS PATIENT QL REPORTED: NO
GLOBULIN PLAS-MCNC: 2.9 G/DL (ref 2–3.5)
GLUCOSE BLD-MCNC: 182 MG/DL (ref 70–99)
GLUCOSE BLOOD: 175
HCT VFR BLD AUTO: 37.5 %
HDLC SERPL-MCNC: 61 MG/DL (ref 40–59)
HEMOGLOBIN A1C: 6.5 % (ref 4.3–5.6)
HGB BLD-MCNC: 12.1 G/DL
IMM GRANULOCYTES # BLD AUTO: 0.02 X10(3) UL (ref 0–1)
IMM GRANULOCYTES NFR BLD: 0.3 %
LDLC SERPL CALC-MCNC: 92 MG/DL (ref ?–100)
LYMPHOCYTES # BLD AUTO: 2.98 X10(3) UL (ref 1–4)
LYMPHOCYTES NFR BLD AUTO: 41.8 %
MCH RBC QN AUTO: 26.8 PG (ref 26–34)
MCHC RBC AUTO-ENTMCNC: 32.3 G/DL (ref 31–37)
MCV RBC AUTO: 83.1 FL
MICROALBUMIN UR-MCNC: 0.7 MG/DL
MICROALBUMIN/CREAT 24H UR-RTO: 6.9 UG/MG (ref ?–30)
MONOCYTES # BLD AUTO: 0.57 X10(3) UL (ref 0.1–1)
MONOCYTES NFR BLD AUTO: 8 %
NEUTROPHILS # BLD AUTO: 3.46 X10 (3) UL (ref 1.5–7.7)
NEUTROPHILS # BLD AUTO: 3.46 X10(3) UL (ref 1.5–7.7)
NEUTROPHILS NFR BLD AUTO: 48.5 %
NONHDLC SERPL-MCNC: 114 MG/DL (ref ?–130)
OSMOLALITY SERPL CALC.SUM OF ELEC: 295 MOSM/KG (ref 275–295)
PLATELET # BLD AUTO: 260 10(3)UL (ref 150–450)
POTASSIUM SERPL-SCNC: 4.2 MMOL/L (ref 3.5–5.1)
PROT SERPL-MCNC: 7.4 G/DL (ref 5.7–8.2)
RBC # BLD AUTO: 4.51 X10(6)UL
SODIUM SERPL-SCNC: 139 MMOL/L (ref 136–145)
TEST STRIP LOT #: NORMAL NUMERIC
TRIGL SERPL-MCNC: 128 MG/DL (ref 30–149)
TSI SER-ACNC: 3.78 UIU/ML (ref 0.55–4.78)
VLDLC SERPL CALC-MCNC: 21 MG/DL (ref 0–30)
WBC # BLD AUTO: 7.1 X10(3) UL (ref 4–11)

## 2025-01-30 PROCEDURE — 36415 COLL VENOUS BLD VENIPUNCTURE: CPT | Performed by: INTERNAL MEDICINE

## 2025-01-30 PROCEDURE — 82043 UR ALBUMIN QUANTITATIVE: CPT | Performed by: INTERNAL MEDICINE

## 2025-01-30 PROCEDURE — 99214 OFFICE O/P EST MOD 30 MIN: CPT | Performed by: INTERNAL MEDICINE

## 2025-01-30 PROCEDURE — 84443 ASSAY THYROID STIM HORMONE: CPT | Performed by: INTERNAL MEDICINE

## 2025-01-30 PROCEDURE — 85025 COMPLETE CBC W/AUTO DIFF WBC: CPT | Performed by: INTERNAL MEDICINE

## 2025-01-30 PROCEDURE — 83036 HEMOGLOBIN GLYCOSYLATED A1C: CPT | Performed by: INTERNAL MEDICINE

## 2025-01-30 PROCEDURE — 80053 COMPREHEN METABOLIC PANEL: CPT | Performed by: INTERNAL MEDICINE

## 2025-01-30 PROCEDURE — 82947 ASSAY GLUCOSE BLOOD QUANT: CPT | Performed by: INTERNAL MEDICINE

## 2025-01-30 PROCEDURE — 80061 LIPID PANEL: CPT | Performed by: INTERNAL MEDICINE

## 2025-01-30 PROCEDURE — 82570 ASSAY OF URINE CREATININE: CPT | Performed by: INTERNAL MEDICINE

## 2025-01-30 RX ORDER — SEMAGLUTIDE 1.34 MG/ML
1 INJECTION, SOLUTION SUBCUTANEOUS WEEKLY
Qty: 9 ML | Refills: 1 | Status: SHIPPED | OUTPATIENT
Start: 2025-01-30

## 2025-01-30 RX ORDER — GLIPIZIDE 10 MG/1
10 TABLET ORAL
Qty: 180 TABLET | Refills: 1 | Status: SHIPPED | OUTPATIENT
Start: 2025-01-30 | End: 2025-01-30

## 2025-01-30 RX ORDER — GLIPIZIDE 5 MG/1
5 TABLET ORAL
Qty: 180 TABLET | Refills: 1 | Status: SHIPPED | OUTPATIENT
Start: 2025-01-30

## 2025-01-30 NOTE — PROGRESS NOTES
Name: Lori Love  Date: 1/30/2025    Referring Physician: No ref. provider found    HISTORY OF PRESENT ILLNESS   Lori Love is a 59 year old female who presents for diabetes mellitus, diagnosed approximately 2 years ago.      Prior HbA, C or glycohemoglobin were 10.0% 3/2021; 7.7% 5/2022; 10.0% 3/2024; 5.9% 7/2024; 6.5% POC Today     Dietary compliance: Fair  Exercise: Yes -->walking   Polyuria/polydipsia: No  Blurred vision: No    Episodes of hypoglycemia: No  Blood Glucose:  Not checking     Medications for DM  Ozempic 0.5mg subcutaneous weekly   Glipizide 10mg PO BID     Metformin d/c'd due to fatigue     REVIEW OF SYSTEMS  Eyes: Diabetic retinopathy present: No            Most recent visit to eye doctor in last 12 months: Yes     CV: Cardiovascular disease present: No - however she does have h/o PE on Eliquis          Hypertension present: No         Hyperlipidemia present: No         Peripheral Vascular Disease present: No    : Nephropathy present: No    Neuro: Neuropathy present: No    Skin: Infection or ulceration: No    Osteoporosis: No    Thyroid disease: No      Medications:     Current Outpatient Medications:     semaglutide (OZEMPIC, 0.25 OR 0.5 MG/DOSE,) 2 MG/3ML Subcutaneous Solution Pen-injector, Inject 0.5 mg into the skin once a week., Disp: 9 mL, Rfl: 1    polyethylene glycol, PEG 3350-KCl-NaBcb-NaCl-NaSulf, 236 g Oral Recon Soln, Take 4,000 mL by mouth As Directed. May substitute prescription with Golytely/Nulytely/Gavilyte and or generic equivalent, if needed. Take bowel preparation as provided by Gastroenterology office, or visit our website at [email or web address]., Disp: 4000 mL, Rfl: 0    apixaban (ELIQUIS) 5 MG Oral Tab, Take 1 tablet (5 mg total) by mouth 2 (two) times daily., Disp: 60 tablet, Rfl: 2    Glucose Blood (ONETOUCH ULTRA) In Vitro Strip, 1 each by Other route daily., Disp: 100 each, Rfl: 1    glipiZIDE 10 MG Oral Tab,  Take 1 tablet (10 mg total) by mouth 2 (two) times daily before meals., Disp: 180 tablet, Rfl: 1    Blood Glucose Monitoring Suppl (ONETOUCH ULTRA 2) w/Device Does not apply Kit, Use to check glucose once daily, Disp: 1 kit, Rfl: 0    Lancets (ONETOUCH DELICA PLUS [id number]) Does not apply Misc, 1 each by Does not apply route daily., Disp: 100 each, Rfl: 3     Allergies:   No Known Allergies    Social History:   Social History     Socioeconomic History    Marital status:    Tobacco Use    Smoking status: Never     Passive exposure: Never    Smokeless tobacco: Never   Vaping Use    Vaping status: Never Used   Substance and Sexual Activity    Alcohol use: No    Drug use: Not Currently   Other Topics Concern    Caffeine Concern Yes     Comment: coffee occassionally    Pt has a pacemaker No    Pt has a defibrillator No    Reaction to local anesthetic No       Medical History:   Past Medical History:    Colon adenoma    x1    Colon adenomas    Deep phlebothrombosis, antepartum (HCC)    Diabetes (HCC)    Female stress incontinence    Glaucoma suspect of both eyes    History of blood transfusion     childbirth    History of pregnancy        Pulmonary embolism (HCC)       Surgical history:   Past Surgical History:   Procedure Laterality Date    Colonoscopy  2019    repeat 2022    Colonoscopy N/A 2019    Procedure: COLONOSCOPY;  Surgeon: Christos Reis MD;  Location: [facility] ENDOSCOPY    Colonoscopy  2025    Colonoscopy N/A 1/3/2025    Procedure: COLONOSCOPY;  Surgeon: Christos Reis MD;  Location: [facility] ENDOSCOPY         PHYSICAL EXAM  /73 (BP Location: Left arm)   Pulse 88   Ht 5' 6\" (1.676 m)   Wt 185 lb 3.2 oz (84 kg)   LMP 2014 (Exact Date)   BMI 29.89 kg/m²     General Appearance:  alert, well developed, in no acute distress  Eyes:  normal conjunctivae, sclera., normal sclera and normal pupils  Ears/Nose/Mouth/Throat/Neck:  no palpable thyroid nodules or cervical  lymphadenopathy  Back: no kyphosis or back tenderness  Lymph Nodes:  No abnormal nodes noted  Musculoskeletal:  normal muscle strength and tone  Skin:  normal moisture and skin texture  Hair & Nails:  normal scalp hair     Hematologic:  no excessive bruising  Neuro:  sensory grossly intact and motor grossly intact  Psychiatric:  oriented to time, self, and place  Nutritional:  no abnormal weight gain or loss  Bilateral barefoot skin diabetic exam is normal, visualized feet and the appearance is normal.  Bilateral monofilament/sensation of both feet is normal.  Pulsation pedal pulse exam of both lower legs/feet is normal as well.        ASSESSMENT/PLAN:      1. Diabetes Mellitus Type 2, controlled  -controlled; 6.5% -->at goal   -Discussed importance of glycemic control to prevent complications of diabetes  -Discussed complications of diabetes include retinopathy, neuropathy, nephropathy and cardiovascular disease  -Discussed ABCs of DM  -Discussed importance of SBGM  -Discussed importance of low CHO diet, recommend 45gm per meal or 135gm per day   -Decrease Glipizide to 5mg PO BID, verbalized understanding of risks and benefits  -Increase Ozempic to 1.0mg subcutaneous weekly, verbalized understanding of risks and benefits  -Side effects with Metformin  -Normotensive  -Normal foot exam performed today   -Normal lipids  -Normal kidney function   -Check yearly labs     RTC 6 months     1/30/2025  Adri Dotson MD

## 2025-03-03 RX ORDER — SEMAGLUTIDE 1.34 MG/ML
1 INJECTION, SOLUTION SUBCUTANEOUS WEEKLY
Qty: 9 ML | Refills: 1 | Status: SHIPPED | OUTPATIENT
Start: 2025-03-03

## 2025-03-05 RX ORDER — BLOOD SUGAR DIAGNOSTIC
STRIP MISCELLANEOUS DAILY
Qty: 100 STRIP | Refills: 0 | Status: SHIPPED | OUTPATIENT
Start: 2025-03-05

## 2025-03-12 ENCOUNTER — PATIENT MESSAGE (OUTPATIENT)
Dept: ENDOCRINOLOGY CLINIC | Facility: CLINIC | Age: 60
End: 2025-03-12

## 2025-03-18 RX ORDER — SEMAGLUTIDE 1.34 MG/ML
1 INJECTION, SOLUTION SUBCUTANEOUS WEEKLY
Qty: 9 ML | Refills: 1 | Status: SHIPPED | OUTPATIENT
Start: 2025-03-18

## 2025-03-18 NOTE — TELEPHONE ENCOUNTER
Endocrine Refill protocol for oral and injectable diabetic medications    Protocol Criteria:  PASSED  Reason: N/A    If all below requirements are met, send a 90-day supply with 1 refill per provider protocol.    Verify appointment with Endocrinology completed in the last 6 months or scheduled in the next 3 months.  Verify A1C has been completed within the last 6 months and is below 8.5%     Last completed office visit: 1/30/2025 Adri Dotson MD   Next scheduled Follow up:   Future Appointments   Date Time Provider Department Center   8/1/2025  9:15 AM Adri Dotson MD [facility]      Last A1c result: Last A1c value was 6.5% done 1/30/2025.

## 2025-03-24 NOTE — TELEPHONE ENCOUNTER
PA approved: [employer] received a request on 03/24/2025 from your prescriber for  coverage of Ozempic Inj 4mg/3ml. MC sent.

## 2025-03-29 RX ORDER — DAPAGLIFLOZIN 10 MG/1
10 TABLET, FILM COATED ORAL DAILY
Qty: 30 TABLET | Refills: 1 | Status: SHIPPED | OUTPATIENT
Start: 2025-03-29 | End: 2025-03-31

## 2025-03-31 ENCOUNTER — TELEPHONE (OUTPATIENT)
Dept: ENDOCRINOLOGY CLINIC | Facility: CLINIC | Age: 60
End: 2025-03-31

## 2025-03-31 NOTE — TELEPHONE ENCOUNTER
[facility] states insurance does not cover Farxiga but insurance does cover Jardiance.  Please call.  Thank you.

## 2025-03-31 NOTE — TELEPHONE ENCOUNTER
Ok to change to Jardiance 25mg PO daily. Prescription sent to pharmacy. See also patient email- she was updated regarding this change.

## 2025-05-02 RX ORDER — SEMAGLUTIDE 1.34 MG/ML
1 INJECTION, SOLUTION SUBCUTANEOUS WEEKLY
Qty: 9 ML | Refills: 1 | Status: SHIPPED | OUTPATIENT
Start: 2025-05-02 | End: 2025-05-05

## 2025-05-02 NOTE — TELEPHONE ENCOUNTER
Endocrine Refill protocol for oral and injectable diabetic medications    Protocol Criteria:  PASSED  Reason: N/A    If all below requirements are met, send a 90-day supply with 1 refill per provider protocol.    Verify appointment with Endocrinology completed in the last 6 months or scheduled in the next 3 months.  Verify A1C has been completed within the last 6 months and is below 8.5%     Last completed office visit: 1/30/2025 Adri Dotson MD   Next scheduled Follow up:   Future Appointments   Date Time Provider Department Center   8/1/2025  9:15 AM Adri Dotson MD [facility]      Last A1c result: Last A1c value was 6.5% done 1/30/2025.

## 2025-05-05 RX ORDER — SEMAGLUTIDE 1.34 MG/ML
1 INJECTION, SOLUTION SUBCUTANEOUS WEEKLY
Qty: 9 ML | Refills: 1 | Status: SHIPPED | OUTPATIENT
Start: 2025-05-05

## 2025-05-05 NOTE — TELEPHONE ENCOUNTER
Endocrine Refill protocol for oral and injectable diabetic medications    Protocol Criteria:  PASSED      If all below requirements are met, send a 90-day supply with 1 refill per provider protocol.    Verify appointment with Endocrinology completed in the last 6 months or scheduled in the next 3 months.  Verify A1C has been completed within the last 6 months and is below 8.5%     Last completed office visit: 1/30/2025 Adri Dotson MD   Next scheduled Follow up:   Future Appointments   Date Time Provider Department Center   8/1/2025  9:15 AM Adri Dotson MD [facility]      Last A1c result: Last A1c value was 6.5% done 1/30/2025.

## 2025-05-09 RX ORDER — SEMAGLUTIDE 1.34 MG/ML
1 INJECTION, SOLUTION SUBCUTANEOUS WEEKLY
Qty: 9 ML | Refills: 1 | Status: CANCELLED | OUTPATIENT
Start: 2025-05-09

## 2025-05-15 ENCOUNTER — MED REC SCAN ONLY (OUTPATIENT)
Dept: INTERNAL MEDICINE CLINIC | Facility: CLINIC | Age: 60
End: 2025-05-15

## 2025-05-15 ENCOUNTER — TELEPHONE (OUTPATIENT)
Dept: INTERNAL MEDICINE CLINIC | Facility: CLINIC | Age: 60
End: 2025-05-15

## 2025-06-05 RX ORDER — SEMAGLUTIDE 1.34 MG/ML
1 INJECTION, SOLUTION SUBCUTANEOUS WEEKLY
Qty: 9 ML | Refills: 1 | Status: CANCELLED | OUTPATIENT
Start: 2025-06-05

## 2025-06-05 RX ORDER — SEMAGLUTIDE 1.34 MG/ML
1 INJECTION, SOLUTION SUBCUTANEOUS WEEKLY
Qty: 9 ML | Refills: 1 | Status: SHIPPED | OUTPATIENT
Start: 2025-06-05

## 2025-06-05 NOTE — TELEPHONE ENCOUNTER
Endocrine Refill protocol for oral and injectable diabetic medications    Protocol Criteria:  PASSED  Reason: N/A    If all below requirements are met, send a 90-day supply with 1 refill per provider protocol.    Verify appointment with Endocrinology completed in the last 6 months or scheduled in the next 3 months.  Verify A1C has been completed within the last 6 months and is below 8.5%     Last completed office visit: 1/30/2025 Adri Dotson MD   Next scheduled Follow up:   Future Appointments   Date Time Provider Department Center   8/1/2025  9:15 AM Adri Dotson MD [facility]      Last A1c result: Last A1c value was 6.5% done 1/30/2025.

## 2025-06-30 RX ORDER — SEMAGLUTIDE 1.34 MG/ML
1 INJECTION, SOLUTION SUBCUTANEOUS WEEKLY
Qty: 9 ML | Refills: 1 | Status: SHIPPED | OUTPATIENT
Start: 2025-06-30

## 2025-06-30 NOTE — TELEPHONE ENCOUNTER
Endocrine Refill protocol for oral and injectable diabetic medications    Protocol Criteria:  PASSED  Reason: N/A    If all below requirements are met, send a 90-day supply with 1 refill per provider protocol.    Verify appointment with Endocrinology completed in the last 6 months or scheduled in the next 3 months.  Verify A1C has been completed within the last 6 months and is below 8.5%     Last completed office visit: 1/30/2025 Adri Dotson MD   Next scheduled Follow up:   Future Appointments   Date Time Provider Department Center   10/10/2025  9:15 AM Adri Dotson MD [facility]      Last A1c result: Last A1c value was 6.5% done 1/30/2025.

## 2025-07-11 ENCOUNTER — TELEPHONE (OUTPATIENT)
Dept: ENDOCRINOLOGY CLINIC | Facility: CLINIC | Age: 60
End: 2025-07-11

## 2025-07-11 NOTE — TELEPHONE ENCOUNTER
Medications - Current[1]  semaglutide (OZEMPIC, 1 MG/DOSE,) 4 MG/3ML Subcutaneous Solution Pen-injector, Inject 1 mg into the skin once a week., Disp: 9 mL, Rfl: 1     KEY: [id number]       [1]   Current Outpatient Medications:     semaglutide (OZEMPIC, 1 MG/DOSE,) 4 MG/3ML Subcutaneous Solution Pen-injector, Inject 1 mg into the skin once a week., Disp: 9 mL, Rfl: 1    semaglutide (OZEMPIC, 1 MG/DOSE,) 4 MG/3ML Subcutaneous Solution Pen-injector, Inject 1 mg into the skin once a week., Disp: 9 mL, Rfl: 1    ONETOUCH ULTRA In Vitro Strip, TEST BLOOD GLUCOSE ONCE DAILY, Disp: 100 strip, Rfl: 0    glipiZIDE 5 MG Oral Tab, Take 1 tablet (5 mg total) by mouth 2 (two) times daily before meals., Disp: 180 tablet, Rfl: 1    polyethylene glycol, PEG 3350-KCl-NaBcb-NaCl-NaSulf, 236 g Oral Recon Soln, Take 4,000 mL by mouth As Directed. May substitute prescription with Golytely/Nulytely/Gavilyte and or generic equivalent, if needed. Take bowel preparation as provided by Gastroenterology office, or visit our website at [email or web address]., Disp: 4000 mL, Rfl: 0    apixaban (ELIQUIS) 5 MG Oral Tab, Take 1 tablet (5 mg total) by mouth 2 (two) times daily., Disp: 60 tablet, Rfl: 2    Blood Glucose Monitoring Suppl (ONETOUCH ULTRA 2) w/Device Does not apply Kit, Use to check glucose once daily, Disp: 1 kit, Rfl: 0    Lancets (ONETOUCH DELICA PLUS EPZSRT30A) Does not apply Misc, 1 each by Does not apply route daily., Disp: 100 each, Rfl: 3

## 2025-07-23 NOTE — TELEPHONE ENCOUNTER
Message from Plan via Covermymeds:  This medication or product was previously approved on [id number] from 2025-03-24 to 2026-03-24. **Please note: This request was submitted electronically. Formulary lowering, tiering exception, cost reduction and/or pre-benefit determination review (including prospective Medicare hospice reviews) requests cannot be requested using this method of submission. Providers contact us at [phone] for further assistance.    Called the patient's pharmacy. Confirmed med is covered by insurance and is ready for .     Sent detailed [employer] message with instructions. Set to receive notification in 1 week if the patient does not read the message to the [facility] CLINICAL STAFF pool.

## (undated) DEVICE — SNARE CAPTIFLEX MICRO-OVL OLY

## (undated) DEVICE — CO2 CANNULA,SSOFT,ADLT,7O2,4CO2,FEMALE: Brand: MEDLINE

## (undated) DEVICE — 35 ML SYRINGE REGULAR TIP: Brand: MONOJECT

## (undated) DEVICE — LINE MNTR ADLT SET O2 INTMD

## (undated) DEVICE — FORCEP RADIAL JAW 4

## (undated) DEVICE — LASSO POLYPECTOMY SNARE: Brand: LASSO

## (undated) DEVICE — Device: Brand: CUSTOM PROCEDURE KIT

## (undated) DEVICE — Device: Brand: DEFENDO AIR/WATER/SUCTION AND BIOPSY VALVE

## (undated) DEVICE — KIT ENDO ORCAPOD 160/180/190

## (undated) DEVICE — KIT CLEAN ENDOKIT 1.1OZ GOWNX2

## (undated) DEVICE — 60 ML SYRINGE REGULAR TIP: Brand: MONOJECT

## (undated) DEVICE — SNARE OPTMZ PLPCTM TRP

## (undated) DEVICE — GIJAW SINGLE-USE BIOPSY FORCEPS WITH NEEDLE: Brand: GIJAW

## (undated) DEVICE — MEDI-VAC NON-CONDUCTIVE SUCTION TUBING 6MM X 1.8M (6FT.) L: Brand: CARDINAL HEALTH

## (undated) DEVICE — V2 SPECIMEN COLLECTION MANIFOLD KIT: Brand: NEPTUNE

## (undated) NOTE — LETTER
[facility]  Administration of Anesthesia  ILori agree to be cared for by a physician anesthesiologist alone and/or with a nurse anesthetist, who is specially trained to monitor me and give me medicine to put me to sleep or keep me comfortable during my procedure    I understand that my anesthesiologist and/or anesthetist is not an employee or agent of [facility] or [employer]. He or she works for [facility]    As the patient asking for anesthesia services, I agree to:  Allow the anesthesiologist (anesthesia doctor) to give me medicine and do additional procedures as necessary. Some examples are: Starting or using an “IV” to give me medicine, fluids or blood during my procedure, and having a breathing tube placed to help me breathe when I’m asleep (intubation). In the event that my heart stops working properly, I understand that my anesthesiologist will make every effort to sustain my life, unless otherwise directed by [facility] Do Not Resuscitate documents.  Tell my anesthesia doctor before my procedure:  If I am pregnant.  The last time that I ate or drank.  iii. All of the medicines I take (including prescriptions, herbal supplements, and pills I can buy without a prescription (including street drugs/illegal medications). Failure to inform my anesthesiologist about these medicines may increase my risk of anesthetic complications.  iv.If I am allergic to anything or have had a reaction to anesthesia before.  I understand how the anesthesia medicine will help me (benefits).  I understand that with any type of anesthesia medicine there are risks:  The most common risks are: nausea, vomiting, sore throat, muscle soreness, damage to my eyes, mouth, or teeth (from breathing tube placement).  Rare risks include: remembering what happened during my procedure, allergic reactions to medications, injury to my airway, heart, lungs, vision, nerves, or  muscles and in extremely rare instances death.  My doctor has explained to me other choices available to me for my care (alternatives).  Pregnant Patients (“epidural”):  I understand that the risks of having an epidural (medicine given into my back to help control pain during labor), include itching, low blood pressure, difficulty urinating, headache or slowing of the baby’s heart. Very rare risks include infection, bleeding, seizure, irregular heart rhythms and nerve injury.  Regional Anesthesia (“spinal”, “epidural”, & “nerve blocks”):  I understand that rare but potential complications include headache, bleeding, infection, seizure, irregular heart rhythms, and nerve injury.    _____________________________________________________________________________  Patient (or Representative) Signature/Relationship to Patient  Date   Time    _____________________________________________________________________________   Name (if used)    Language/Organization   Time    _____________________________________________________________________________  Nurse Anesthetist Signature     Date   Time  _____________________________________________________________________________  Anesthesiologist Signature     Date   Time  I have discussed the procedure and information above with the patient (or patient’s representative) and answered their questions. The patient or their representative has agreed to have anesthesia services.    _____________________________________________________________________________  Witness        Date   Time  I have verified that the signature is that of the patient or patient’s representative, and that it was signed before the procedure  Patient Name: Lori Love     : 1965                 Printed: 2024 at 9:18 AM    Medical Record #: [id number]                                            Page 1 of 1  ----------ANESTHESIA CONSENT----------

## (undated) NOTE — LETTER
2/20/2020              [address] [place] [address]         Dear Srinivasan Ledbetter,    This letter is to inform you that our office has made several attempts to reach you by phone without success.   We were attempting to contac

## (undated) NOTE — LETTER
[place]  [address] [facility] [address]  [phone]                07/02/19      Lucrecia Ordonez  [facility] [address]  [facility] [id number]    Dear Ivett Bedoya,    I reviewed the pathology report from

## (undated) NOTE — LETTER
04/04/18        Vilinda Certain  [address]  [facility] [address]      Dear [facility],    [facility] records indicate that you have outstanding lab work and or testing that was ordered for you and has not yet been completed:          Assay, Thyroid Stim Horm

## (undated) NOTE — LETTER
5/3/2022    Taylor Srivastava        [address] [facility]            Dear Taylor Srivastava,      Our records indicate that you are due for an appointment for a Colonoscopy in July 2022, or sometime there after, with Rayfield Lanes, MD.    Please call our office to schedule this appointment. Your medical well-being is important to us. If your insurance requires a referral, please call your primary care office to request one.       Thank you,      The Physicians and Staff at [facility]

## (undated) NOTE — LETTER
[facility]  [address], [place], [place]    Authorization for Surgical Operation and Procedure                               I hereby authorize Christos Reis MD, my physician and his/her assistants (if applicable), which may include medical students, residents, and/or fellows, to perform the following surgical operation/ procedure and administer such anesthesia as may be determined necessary by my physician: Operation/Procedure name (s) COLONOSCOPY on Lori Love   2.   I recognize that during the surgical operation/procedure, unforeseen conditions may necessitate additional or different procedures than those listed above.  I, therefore, further authorize and request that the above-named surgeon, assistants, or designees perform such procedures as are, in their judgment, necessary and desirable.    3.   My surgeon/physician has discussed prior to my surgery the potential benefits, risks and side effects of this procedure; the likelihood of achieving goals; and potential problems that might occur during recuperation.  They also discussed reasonable alternatives to the procedure, including risks, benefits, and side effects related to the alternatives and risks related to not receiving this procedure.  I have had all my questions answered and I acknowledge that no guarantee has been made as to the result that may be obtained.    4.   Should the need arise during my operation/procedure, which includes change of level of care prior to discharge, I also consent to the administration of blood and/or blood products.  Further, I understand that despite careful testing and screening of blood or blood products by collecting agencies, I may still be subject to ill effects as a result of receiving a blood transfusion and/or blood products.  The following are some, but not all, of the potential risks that can occur: fever and allergic reactions, hemolytic reactions, transmission of diseases such as  Hepatitis, AIDS and Cytomegalovirus (CMV) and fluid overload.  In the event that I wish to have an autologous transfusion of my own blood, or a directed donor transfusion, I will discuss this with my physician.  Check only if Refusing Blood or Blood Products  I understand refusal of blood or blood products as deemed necessary by my physician may have serious consequences to my condition to include possible death. I hereby assume responsibility for my refusal and release the hospital, its personnel, and my physicians from any responsibility for the consequences of my refusal.    o  Refuse   5.   I authorize the use of any specimen, organs, tissues, body parts or foreign objects that may be removed from my body during the operation/procedure for diagnosis, research or teaching purposes and their subsequent disposal by hospital authorities.  I also authorize the release of specimen test results and/or written reports to my treating physician on the hospital medical staff or other referring or consulting physicians involved in my care, at the discretion of the Pathologist or my treating physician.    6.   I consent to the photographing or videotaping of the operations or procedures to be performed, including appropriate portions of my body for medical, scientific, or educational purposes, provided my identity is not revealed by the pictures or by descriptive texts accompanying them.  If the procedure has been photographed/videotaped, the surgeon will obtain the original picture, image, videotape or CD.  The hospital will not be responsible for storage, release or maintenance of the picture, image, tape or CD.    7.   I consent to the presence of a  or observers in the operating room as deemed necessary by my physician or their designees.    8.   I recognize that in the event my procedure results in extended X-Ray/fluoroscopy time, I may develop a skin reaction.    9. If I have a Do Not Attempt  Resuscitation (DNAR) order in place, that status will be suspended while in the operating room, procedural suite, and during the recovery period unless otherwise explicitly stated by me (or a person authorized to consent on my behalf). The surgeon or my attending physician will determine when the applicable recovery period ends for purposes of reinstating the DNAR order.  10. Patients having a sterilization procedure: I understand that if the procedure is successful the results will be permanent and it will therefore be impossible for me to inseminate, conceive, or bear children.  I also understand that the procedure is intended to result in sterility, although the result has not been guaranteed.   11. I acknowledge that my physician has explained sedation/analgesia administration to me including the risk and benefits I consent to the administration of sedation/analgesia as may be necessary or desirable in the judgment of my physician.    I CERTIFY THAT I HAVE READ AND FULLY UNDERSTAND THE ABOVE CONSENT TO OPERATION and/or OTHER PROCEDURE.     ____________________________________  _________________________________        ______________________________  Signature of Patient    Signature of Responsible Person                Printed Name of Responsible Person                                      ____________________________________  _____________________________                ________________________________  Signature of Witness        Date  Time         Relationship to Patient    STATEMENT OF PHYSICIAN My signature below affirms that prior to the time of the procedure; I have explained to the patient and/or his/her legal representative, the risks and benefits involved in the proposed treatment and any reasonable alternative to the proposed treatment. I have also explained the risks and benefits involved in refusal of the proposed treatment and alternatives to the proposed treatment and have answered the patient's  questions. If I have a significant financial interest in a co-management agreement or a significant financial interest in any product or implant, or other significant relationship used in this procedure/surgery, I have disclosed this and had a discussion with my patient.     _____________________________________________________              _____________________________  (Signature of Physician)                                                                                         (Date)                                   (Time)  Patient Name: Lori Love      : 1965      Printed: 2024     Medical Record #: [id number]                                      Page 1 of 1

## (undated) NOTE — LETTER
December 10, 2020    Dwayne Myles, [address] [facility]     Patient: Bulmaro Mcgraw   YOB: 1965   Date of Visit: 12/10/2020       Dear Dr. Omaira York MD:    Thank you for referring Bulmaro Mcgraw to me for evaluation. Current Outpatient Medications   Medication Sig Dispense Refill   • METFORMIN HCL  MG Oral Tablet 24 Hr TAKE 1 TABLET BY MOUTH EVERY DAY WITH BREAKFAST 90 tablet 0   • apixaban (ELIQUIS) 5 MG Oral Tab Take 1 tablet (5 mg total) by mouth 2 (two) times Type: Distance only          Manifest Refraction       [facility] [place] [facility] [place]    Right -2.25 +1.50 170 20/30 +2.75 20/20    Left -2.00 +1.25 030 20/20- +2.75 20/20          Final Rx       [facility] [place] [facility]    Right -2.25 +1.50

## (undated) NOTE — LETTER
12/15/2020            Deloras Seip        [address] [place] [address]       Dear Benji Gannon,    The visual field test you took on 12/15/2020 indicated normal field of vision in both eyes.   An optic nerve analysis showed normal retinal

## (undated) NOTE — LETTER
Bulmaro Mcgraw        [facility] [facility] [address]         To Whom It May Concern,    Bulmaro Mcgraw (2/25/1965) is under my medical care.   She is has chronic low back pain  And will benefit from an ergonomic desk and ch

## (undated) NOTE — LETTER
01/04/18        [address]  [place] [address]      Dear Ivett Bedoya,    [facility] records indicate that you have outstanding lab work and or testing that was ordered for you and has not yet been completed:          Assay, Thyroid Stim Horm

## (undated) NOTE — LETTER
04/23/19        [address]  [facility] [address]      Dear Arvind Reynolds,    Our records indicate that you have outstanding lab work and or testing that was ordered for you and has not yet been completed:  Orders Placed This Encounter